# Patient Record
Sex: FEMALE | URBAN - METROPOLITAN AREA
[De-identification: names, ages, dates, MRNs, and addresses within clinical notes are randomized per-mention and may not be internally consistent; named-entity substitution may affect disease eponyms.]

---

## 2024-10-18 ENCOUNTER — OUTPATIENT (OUTPATIENT)
Dept: OUTPATIENT SERVICES | Facility: HOSPITAL | Age: 34
LOS: 1 days | End: 2024-10-18
Payer: COMMERCIAL

## 2024-10-18 VITALS — HEART RATE: 77 BPM | RESPIRATION RATE: 18 BRPM | OXYGEN SATURATION: 100 %

## 2024-10-18 VITALS — OXYGEN SATURATION: 99 % | HEART RATE: 91 BPM

## 2024-10-18 LAB
BLD GP AB SCN SERPL QL: NEGATIVE — SIGNIFICANT CHANGE UP
HCT VFR BLD CALC: 35.4 % — SIGNIFICANT CHANGE UP (ref 34.5–45)
HGB BLD-MCNC: 11.9 G/DL — SIGNIFICANT CHANGE UP (ref 11.5–15.5)
MCHC RBC-ENTMCNC: 31.2 PG — SIGNIFICANT CHANGE UP (ref 27–34)
MCHC RBC-ENTMCNC: 33.6 GM/DL — SIGNIFICANT CHANGE UP (ref 32–36)
MCV RBC AUTO: 92.7 FL — SIGNIFICANT CHANGE UP (ref 80–100)
NRBC # BLD: 0 /100 WBCS — SIGNIFICANT CHANGE UP (ref 0–0)
PLATELET # BLD AUTO: 271 K/UL — SIGNIFICANT CHANGE UP (ref 150–400)
RBC # BLD: 3.82 M/UL — SIGNIFICANT CHANGE UP (ref 3.8–5.2)
RBC # FLD: 12.7 % — SIGNIFICANT CHANGE UP (ref 10.3–14.5)
RH IG SCN BLD-IMP: POSITIVE — SIGNIFICANT CHANGE UP
WBC # BLD: 10.33 K/UL — SIGNIFICANT CHANGE UP (ref 3.8–10.5)
WBC # FLD AUTO: 10.33 K/UL — SIGNIFICANT CHANGE UP (ref 3.8–10.5)

## 2024-10-18 RX ORDER — ACETAMINOPHEN 325 MG
1000 TABLET ORAL ONCE
Refills: 0 | Status: COMPLETED | OUTPATIENT
Start: 2024-10-18 | End: 2024-10-18

## 2024-10-18 RX ORDER — FAMOTIDINE 40 MG
20 TABLET ORAL ONCE
Refills: 0 | Status: COMPLETED | OUTPATIENT
Start: 2024-10-18 | End: 2024-10-18

## 2024-10-18 RX ORDER — SODIUM CHLORIDE IRRIG SOLUTION 0.9 %
1000 SOLUTION, IRRIGATION IRRIGATION
Refills: 0 | Status: ACTIVE | OUTPATIENT
Start: 2024-10-18 | End: 2025-09-16

## 2024-10-18 RX ORDER — SODIUM CITRATE AND CITRIC ACID MONOHYDRATE 334; 500 MG/5ML; MG/5ML
30 SOLUTION ORAL ONCE
Refills: 0 | Status: COMPLETED | OUTPATIENT
Start: 2024-10-18 | End: 2024-10-18

## 2024-10-18 RX ADMIN — SODIUM CITRATE AND CITRIC ACID MONOHYDRATE 30 MILLILITER(S): 334; 500 SOLUTION ORAL at 09:10

## 2024-10-18 RX ADMIN — Medication 400 MILLIGRAM(S): at 11:52

## 2024-10-18 RX ADMIN — Medication 75 MILLILITER(S): at 12:06

## 2024-10-18 RX ADMIN — Medication 20 MILLIGRAM(S): at 08:58

## 2024-10-18 NOTE — OB PROVIDER TRIAGE NOTE - NSOBPROVIDERNOTE_OBGYN_ALL_OB_FT
A&P:   #Cerclage  - CBC/T&S  - FHR pre and post op  - s/p Pepcid and Bicitra  - Consents signed with the patient.   - Accepts blood      Discussed with Dr. Zachary Louise PGY2

## 2024-10-18 NOTE — BRIEF OPERATIVE NOTE - NSICDXBRIEFPROCEDURE_GEN_ALL_CORE_FT
PROCEDURES:  Luann cerclage of cervix during pregnancy by vaginal approach 18-Oct-2024 11:09:00  Chalo Fraire

## 2024-10-18 NOTE — BRIEF OPERATIVE NOTE - OPERATION/FINDINGS
EUA w/ closed cervix. Straight cath'ed for 40cc   5 Ticron used for a Kong Cerclage  (+)FH w/ gross FM seen after.   CL 4.29cm post-op w/ length distal 2.91cm

## 2024-10-18 NOTE — OB RN PREOPERATIVE CHECKLIST - NS PREOP CHK CHLOROHEX WASH
N/A
You can access the FollowMyHealth Patient Portal offered by [facility] by registering at the following website: [email or web address]. By joining [employer]’s FollowMyHealth portal, you will also be able to view your health information using other applications (apps) compatible with our system.

## 2024-10-18 NOTE — PRE-ANESTHESIA EVALUATION ADULT - NSANTHPMHFT_GEN_ALL_CORE
hx/o sz's 10/2019, s/p L crani for cavernous AVM;  pt denies seizures since however maintained on Keppra;  pt has regular f/u w/ neurologist, most recently this pregnancy;  Last imaging 10/23 showing s/p L craniotomy w/ postoperative cavity in the l frontal lobe, w/o hemorrhage, edema, or enhancement, otherwise unremarkable examination.  s/p urgent/emergent 2017 @ 30 wks (NRF), PRITESH ([facility])  Hx/o breast augmentation

## 2024-10-18 NOTE — OB RN PREOPERATIVE CHECKLIST - SURGICAL CONSENT
[facility]          Subjective:              Patient is a 50 y.o. female who is being seen as a returning patient for a diabetic pedal exam and multiple other lower extremity complaints. Pt states she has close follow up with her PCP (Dr. Oumou Mckeon). She states her last office visit  with her PCP was 05/24/2023. She states her diabetes has been out of control lately. She states she now has wounds to her right foot. She has completed her PT/OT. She states she has not been able to  her AFO. States she does have mild foot pain, rising to 2/10. Describes the pain  as a ache at the end of the day. She denies any recent trauma. Denies any local/systemic signs of infx. Denies any other LE complaints             Past Medical History:       Diagnosis                                       Past Surgical History:        Procedure                                                    Family History        Problem                                                   Social History            Tobacco Use                               No Known Allergies       Prior to Admission medications            Medication           insulin glargine (LANTUS,BASAGLAR) 100 unit/mL (3 mL) inpn    OneTouch Ultra Test strip           bacitracin zinc (BACITRACIN) ointment           Review of Systems    Constitutional: Negative. HENT: Negative. Eyes: Negative. Respiratory: Negative. Cardiovascular: Negative. Gastrointestinal:  Positive for diarrhea. Endocrine: Negative. Genitourinary: Negative. Musculoskeletal:  Positive for arthralgias. Skin: Negative. Allergic/Immunologic: Positive for immunocompromised state. Neurological:  Positive for numbness. Hematological: Negative. Psychiatric/Behavioral:  Positive for dysphoric mood. The patient  is nervous/anxious. All other systems reviewed and are negative.           Objective:        Visit Vitals done

## 2024-10-18 NOTE — OB PROVIDER TRIAGE NOTE - HISTORY OF PRESENT ILLNESS
Triage Note    Pt is a 32y/o  at 12w0d who presented to triage for history indicated cerclage placement.  Patient reports that she had a short cervix in her last pregnancy which was first noted around 15wk, she took progesterone suppositories during the pregnancy, and ended up delivering .    OBHx:   - 2017 pCS 3#14 at 30w3d for PTL, baby was C/S for breech  GynHx: denies h/o STI's, fibroids, cysts. +H/o abn pap s/p CKC  PMHx: seizure s/p brain bleed (related to AVM) in 2019, no no seizures since that time  PSHx: craniotomy 2019, C/Sx1, CKC 2016, breast augmentation  Med: Keppra 500BID, Macrobid  All: NKDA  SH: denies alcohol, tobacco, or drug use  Psych: +h/o anxiety  Accepts blood

## 2024-10-18 NOTE — OB PROVIDER TRIAGE NOTE - NSHPPHYSICALEXAM_GEN_ALL_CORE
Preop FHR: 164    General: comfortable, NAD  Pulm: unlabored breathing  Abd: gravid, soft, nontender

## 2024-10-18 NOTE — PRE-ANESTHESIA EVALUATION ADULT - NSANTHADDINFOFT_GEN_ALL_CORE
Pt. had seen neurologist recently, w/ letter of clearance for anesthesia. Pt. had seen neurologist recently, w/ letter of clearance for anesthesia.  Discussed w/ Dr. Brizuela this am, who agrees there is no contraindication to neuraxial anesthesia (spinal) given her history.  Explained risks/alternatives in detail to pt and pt's , including but not limited to HA, failure, nv injury.  All questions answered.

## 2025-04-09 ENCOUNTER — OUTPATIENT (OUTPATIENT)
Dept: OUTPATIENT SERVICES | Facility: HOSPITAL | Age: 35
LOS: 1 days | End: 2025-04-09
Payer: COMMERCIAL

## 2025-04-09 VITALS
HEIGHT: 63 IN | SYSTOLIC BLOOD PRESSURE: 104 MMHG | DIASTOLIC BLOOD PRESSURE: 67 MMHG | RESPIRATION RATE: 17 BRPM | OXYGEN SATURATION: 100 % | TEMPERATURE: 98 F | WEIGHT: 145.95 LBS | HEART RATE: 92 BPM

## 2025-04-09 DIAGNOSIS — O09.299 SUPERVISION OF PREGNANCY WITH OTHER POOR REPRODUCTIVE OR OBSTETRIC HISTORY, UNSPECIFIED TRIMESTER: Chronic | ICD-10-CM

## 2025-04-09 DIAGNOSIS — Z98.891 HISTORY OF UTERINE SCAR FROM PREVIOUS SURGERY: ICD-10-CM

## 2025-04-09 DIAGNOSIS — Q28.3 OTHER MALFORMATIONS OF CEREBRAL VESSELS: Chronic | ICD-10-CM

## 2025-04-09 DIAGNOSIS — O34.219 MATERNAL CARE FOR UNSPECIFIED TYPE SCAR FROM PREVIOUS CESAREAN DELIVERY: ICD-10-CM

## 2025-04-09 DIAGNOSIS — Z98.82 BREAST IMPLANT STATUS: Chronic | ICD-10-CM

## 2025-04-09 DIAGNOSIS — Z98.891 HISTORY OF UTERINE SCAR FROM PREVIOUS SURGERY: Chronic | ICD-10-CM

## 2025-04-09 DIAGNOSIS — Z98.890 OTHER SPECIFIED POSTPROCEDURAL STATES: Chronic | ICD-10-CM

## 2025-04-09 LAB
ANION GAP SERPL CALC-SCNC: 16 MMOL/L — SIGNIFICANT CHANGE UP (ref 5–17)
BLD GP AB SCN SERPL QL: NEGATIVE — SIGNIFICANT CHANGE UP
BUN SERPL-MCNC: 8 MG/DL — SIGNIFICANT CHANGE UP (ref 7–23)
CALCIUM SERPL-MCNC: 9.5 MG/DL — SIGNIFICANT CHANGE UP (ref 8.4–10.5)
CHLORIDE SERPL-SCNC: 101 MMOL/L — SIGNIFICANT CHANGE UP (ref 96–108)
CO2 SERPL-SCNC: 18 MMOL/L — LOW (ref 22–31)
CREAT SERPL-MCNC: 0.32 MG/DL — LOW (ref 0.5–1.3)
EGFR: 142 ML/MIN/1.73M2 — SIGNIFICANT CHANGE UP
EGFR: 142 ML/MIN/1.73M2 — SIGNIFICANT CHANGE UP
GLUCOSE SERPL-MCNC: 76 MG/DL — SIGNIFICANT CHANGE UP (ref 70–99)
HCT VFR BLD CALC: 32 % — LOW (ref 34.5–45)
HGB BLD-MCNC: 10.3 G/DL — LOW (ref 11.5–15.5)
MCHC RBC-ENTMCNC: 28 PG — SIGNIFICANT CHANGE UP (ref 27–34)
MCHC RBC-ENTMCNC: 32.2 G/DL — SIGNIFICANT CHANGE UP (ref 32–36)
MCV RBC AUTO: 87 FL — SIGNIFICANT CHANGE UP (ref 80–100)
NRBC BLD AUTO-RTO: 0 /100 WBCS — SIGNIFICANT CHANGE UP (ref 0–0)
PLATELET # BLD AUTO: 277 K/UL — SIGNIFICANT CHANGE UP (ref 150–400)
POTASSIUM SERPL-MCNC: 3.8 MMOL/L — SIGNIFICANT CHANGE UP (ref 3.5–5.3)
POTASSIUM SERPL-SCNC: 3.8 MMOL/L — SIGNIFICANT CHANGE UP (ref 3.5–5.3)
RBC # BLD: 3.68 M/UL — LOW (ref 3.8–5.2)
RBC # FLD: 14.3 % — SIGNIFICANT CHANGE UP (ref 10.3–14.5)
RH IG SCN BLD-IMP: POSITIVE — SIGNIFICANT CHANGE UP
SODIUM SERPL-SCNC: 135 MMOL/L — SIGNIFICANT CHANGE UP (ref 135–145)
WBC # BLD: 9.85 K/UL — SIGNIFICANT CHANGE UP (ref 3.8–10.5)
WBC # FLD AUTO: 9.85 K/UL — SIGNIFICANT CHANGE UP (ref 3.8–10.5)

## 2025-04-09 RX ORDER — LEVETIRACETAM 10 MG/ML
1 INJECTION, SOLUTION INTRAVENOUS
Refills: 0 | DISCHARGE

## 2025-04-09 RX ORDER — B1/B2/B3/B5/B6/B12/VIT C/FOLIC 500-0.5 MG
1 TABLET ORAL
Refills: 0 | DISCHARGE

## 2025-04-09 NOTE — OB PST NOTE - NSHPPHYSICALEXAM_GEN_ALL_CORE
Neurological: Alert & Oriented x 3  Respiratory: CTA B/L, No wheezing/crackles/rhonchi  Cardiovascular: (+) S1 & S2, RRR  Gastrointestinal: soft, NT, nondistended, (+) BS  Genitourinary: voiding freely  Extremities: No pedal edema

## 2025-04-09 NOTE — OB PST NOTE - NSANTHOSAYNRD_GEN_A_CORE
No. MARITA screening performed.  STOP BANG Legend: 0-2 = LOW Risk; 3-4 = INTERMEDIATE Risk; 5-8 = HIGH Risk

## 2025-04-09 NOTE — OB PST NOTE - NS_FINALEDD_OBGYN_ALL_OB_DT
hormonal replacement therapy (delestrogen & depotestosterone) , injection given via L Gluteal,  information given to pt about menopause & hrt medications, next appt on 09/27/17 @ 1:30   02-May-2025

## 2025-04-09 NOTE — OB PST NOTE - PROBLEM SELECTOR PLAN 1
Preop instructions and chlorhexidine soap given   hydration instructions provided  Labs CBC BMP T&S performed in PST   Continue with anti- seizure medications

## 2025-04-09 NOTE — OB PST NOTE - NSHPREVIEWOFSYSTEMS_GEN_ALL_CORE
Denies any lightheadedness, dizziness, CP, palpitations or SOB  Denies any n/v or abdominal pain  Denies any vaginal bleeding or spotting

## 2025-04-09 NOTE — OB PST NOTE - HISTORY OF PRESENT ILLNESS
This is a 34 year old female currently 37 weeks gestation,  cerclage placed at 12 weeks gestation. H/o Seizures (lastly 2019) s/p AVM repair in 2019. Follows up with neurology annually, Dr. Armand Brantley ([facility]). Presenting to PST with  for scheduled Repeat  w/ bilateral tubal ligation on 25 on Dr. Stone         This is a 34 year old female currently 37 weeks gestation,  cerclage placed at 12 weeks gestation (due prior pregnancy h/o incompetent cervix). H/o Seizures (lastly 2019) s/p AVM repair in 2019. Follows up with neurology annually, Dr. Armand Brantley ([facility]). Presenting to PST with  for scheduled Repeat  w/ bilateral tubal ligation on 25 on Dr. Stone

## 2025-04-09 NOTE — OB PST NOTE - ASSESSMENT
DASI Score: 8  DASI Activity: able to go up one flight of stairs or walk 1-2 blocks without difficulty  Loose or removable teeth: denies      CAPRINI SCORE    AGE RELATED RISK FACTORS                                                             [ ] Age 41-60 years                                            (1 Point)  [ ] Age: 61-74 years                                           (2 Points)                 [ ] Age= 75 years                                                (3 Points)             DISEASE RELATED RISK FACTORS                                                       [ ] Edema in the lower extremities                 (1 Point)                     [ ] Varicose veins                                               (1 Point)                                 [ ] BMI > 25 Kg/m2                                            (1 Point)                                  [ ] Serious infection (ie PNA)                            (1 Point)                     [ ] Lung disease ( COPD, Emphysema)            (1 Point)                                                                          [ ] Acute myocardial infarction                         (1 Point)                  [ ] Congestive heart failure (in the previous month)  (1 Point)         [ ] Inflammatory bowel disease                            (1 Point)                  [ ] Central venous access, PICC or Port               (2 points)       (within the last month)                                                                [ ] Stroke (in the previous month)                        (5 Points)    [ ] Previous or present malignancy                       (2 points)                                                                                                                                                         HEMATOLOGY RELATED FACTORS                                                         [ ] Prior episodes of VTE                                     (3 Points)                     [ ] Positive family history for VTE                      (3 Points)                  [ ] Prothrombin 17377 A                                     (3 Points)                     [ ] Factor V Leiden                                                (3 Points)                        [ ] Lupus anticoagulants                                      (3 Points)                                                           [ ] Anticardiolipin antibodies                              (3 Points)                                                       [ ] High homocysteine in the blood                   (3 Points)                                             [ ] Other congenital or acquired thrombophilia      (3 Points)                                                [ ] Heparin induced thrombocytopenia                  (3 Points)                                        MOBILITY RELATED FACTORS  [ ] Bed rest                                                         (1 Point)  [ ] Plaster cast                                                    (2 points)  [ ] Bed bound for more than 72 hours           (2 Points)    GENDER SPECIFIC FACTORS  [x ] Pregnancy or had a baby within the last month   (1 Point)  [ ] Post-partum < 6 weeks                                   (1 Point)  [ ] Hormonal therapy  or oral contraception   (1 Point)  [ ] History of pregnancy complications              (1 point)  [ ] Unexplained or recurrent              (1 Point)    OTHER RISK FACTORS                                           (1 Point)  [ ] BMI >40, smoking, diabetes requiring insulin, chemotherapy  blood transfusions and length of surgery over 2 hours    SURGERY RELATED RISK FACTORS  [ ]  Section within the last month     (1 Point)  [ ] Minor surgery                                                  (1 Point)  [ ] Arthroscopic surgery                                       (2 Points)  [ x] Planned major surgery lasting more            (2 Points)      than 45 minutes     [ ] Elective hip or knee joint replacement       (5 points)       surgery                                                TRAUMA RELATED RISK FACTORS  [ ] Fracture of the hip, pelvis, or leg                       (5 Points)  [ ] Spinal cord injury resulting in paralysis             (5 points)       (in the previous month)    [ ] Paralysis  (less than 1 month)                             (5 Points)  [ ] Multiple Trauma within 1 month                        (5 Points)    Total Score [3 ]    Caprini Score 0-2: Low Risk, NO VTE prophylaxis required for most patients, encourage ambulation  Caprini Score 3-6: Moderate Risk , pharmacologic VTE prophylaxis is indicated for most patients (in the absence of contraindications)  Caprini Score Greater than or =7: High risk, pharmocologic VTE prophylaxis indicated for most patients (in the absence of contraindications)                                     CAPRINI SCORE    AGE RELATED RISK FACTORS                                                             [ ] Age 41-60 years                                            (1 Point)  [ ] Age: 61-74 years                                           (2 Points)                 [ ] Age= 75 years                                                (3 Points)             DISEASE RELATED RISK FACTORS                                                       [ ] Edema in the lower extremities                 (1 Point)                     [ ] Varicose veins                                               (1 Point)                                 [ ] BMI > 25 Kg/m2                                            (1 Point)                                  [ ] Serious infection (ie PNA)                            (1 Point)                     [ ] Lung disease ( COPD, Emphysema)            (1 Point)                                                                          [ ] Acute myocardial infarction                         (1 Point)                  [ ] Congestive heart failure (in the previous month)  (1 Point)         [ ] Inflammatory bowel disease                            (1 Point)                  [ ] Central venous access, PICC or Port               (2 points)       (within the last month)                                                                [ ] Stroke (in the previous month)                        (5 Points)    [ ] Previous or present malignancy                       (2 points)                                                                                                                                                         HEMATOLOGY RELATED FACTORS                                                         [ ] Prior episodes of VTE                                     (3 Points)                     [ ] Positive family history for VTE                      (3 Points)                  [ ] Prothrombin 66585 A                                     (3 Points)                     [ ] Factor V Leiden                                                (3 Points)                        [ ] Lupus anticoagulants                                      (3 Points)                                                           [ ] Anticardiolipin antibodies                              (3 Points)                                                       [ ] High homocysteine in the blood                   (3 Points)                                             [ ] Other congenital or acquired thrombophilia      (3 Points)                                                [ ] Heparin induced thrombocytopenia                  (3 Points)                                        MOBILITY RELATED FACTORS  [ ] Bed rest                                                         (1 Point)  [ ] Plaster cast                                                    (2 points)  [ ] Bed bound for more than 72 hours           (2 Points)    GENDER SPECIFIC FACTORS  [x ] Pregnancy or had a baby within the last month   (1 Point)  [ ] Post-partum < 6 weeks                                   (1 Point)  [ ] Hormonal therapy  or oral contraception   (1 Point)  [ ] History of pregnancy complications              (1 point)  [ ] Unexplained or recurrent              (1 Point)    OTHER RISK FACTORS                                           (1 Point)  [ ] BMI >40, smoking, diabetes requiring insulin, chemotherapy  blood transfusions and length of surgery over 2 hours    SURGERY RELATED RISK FACTORS  [ ]  Section within the last month     (1 Point)  [ ] Minor surgery                                                  (1 Point)  [ ] Arthroscopic surgery                                       (2 Points)  [ x] Planned major surgery lasting more            (2 Points)      than 45 minutes     [ ] Elective hip or knee joint replacement       (5 points)       surgery                                                TRAUMA RELATED RISK FACTORS  [ ] Fracture of the hip, pelvis, or leg                       (5 Points)  [ ] Spinal cord injury resulting in paralysis             (5 points)       (in the previous month)    [ ] Paralysis  (less than 1 month)                             (5 Points)  [ ] Multiple Trauma within 1 month                        (5 Points)    Total Score [3 ]    Caprini Score 0-2: Low Risk, NO VTE prophylaxis required for most patients, encourage ambulation  Caprini Score 3-6: Moderate Risk , pharmacologic VTE prophylaxis is indicated for most patients (in the absence of contraindications)  Caprini Score Greater than or =7: High risk, pharmocologic VTE prophylaxis indicated for most patients (in the absence of contraindications)

## 2025-04-09 NOTE — OB PST NOTE - NSICDXPASTSURGICALHX_GEN_ALL_CORE_FT
PAST SURGICAL HISTORY:  Cerebral cavernous malformation     History of breast augmentation     History of cerclage, currently pregnant     History of  section     History of cone biopsy of cervix

## 2025-04-21 ENCOUNTER — INPATIENT (INPATIENT)
Facility: HOSPITAL | Age: 35
LOS: 1 days | Discharge: HOME | DRG: 951 | End: 2025-04-23
Attending: OBSTETRICS & GYNECOLOGY | Admitting: OBSTETRICS & GYNECOLOGY
Payer: COMMERCIAL

## 2025-04-21 ENCOUNTER — EMERGENCY (EMERGENCY)
Facility: HOSPITAL | Age: 35
LOS: 1 days | End: 2025-04-21
Attending: EMERGENCY MEDICINE | Admitting: EMERGENCY MEDICINE
Payer: COMMERCIAL

## 2025-04-21 VITALS
HEART RATE: 87 BPM | DIASTOLIC BLOOD PRESSURE: 79 MMHG | OXYGEN SATURATION: 99 % | HEIGHT: 63 IN | TEMPERATURE: 98 F | WEIGHT: 149.91 LBS | SYSTOLIC BLOOD PRESSURE: 125 MMHG | RESPIRATION RATE: 18 BRPM

## 2025-04-21 VITALS
TEMPERATURE: 98 F | RESPIRATION RATE: 5 BRPM | SYSTOLIC BLOOD PRESSURE: 123 MMHG | DIASTOLIC BLOOD PRESSURE: 73 MMHG | HEART RATE: 56 BPM | OXYGEN SATURATION: 100 %

## 2025-04-21 DIAGNOSIS — O09.299 SUPERVISION OF PREGNANCY WITH OTHER POOR REPRODUCTIVE OR OBSTETRIC HISTORY, UNSPECIFIED TRIMESTER: Chronic | ICD-10-CM

## 2025-04-21 DIAGNOSIS — Z98.891 HISTORY OF UTERINE SCAR FROM PREVIOUS SURGERY: Chronic | ICD-10-CM

## 2025-04-21 DIAGNOSIS — Q28.3 OTHER MALFORMATIONS OF CEREBRAL VESSELS: Chronic | ICD-10-CM

## 2025-04-21 DIAGNOSIS — O26.899 OTHER SPECIFIED PREGNANCY RELATED CONDITIONS, UNSPECIFIED TRIMESTER: ICD-10-CM

## 2025-04-21 DIAGNOSIS — Z34.80 ENCOUNTER FOR SUPERVISION OF OTHER NORMAL PREGNANCY, UNSPECIFIED TRIMESTER: ICD-10-CM

## 2025-04-21 DIAGNOSIS — Z98.890 OTHER SPECIFIED POSTPROCEDURAL STATES: Chronic | ICD-10-CM

## 2025-04-21 DIAGNOSIS — Z98.82 BREAST IMPLANT STATUS: Chronic | ICD-10-CM

## 2025-04-21 PROBLEM — Z87.898 PERSONAL HISTORY OF OTHER SPECIFIED CONDITIONS: Chronic | Status: ACTIVE | Noted: 2025-04-09

## 2025-04-21 LAB
ADD ON TEST-SPECIMEN IN LAB: SIGNIFICANT CHANGE UP
BASOPHILS # BLD AUTO: 0.03 K/UL — SIGNIFICANT CHANGE UP (ref 0–0.2)
BASOPHILS NFR BLD AUTO: 0.3 % — SIGNIFICANT CHANGE UP (ref 0–2)
BLD GP AB SCN SERPL QL: NEGATIVE — SIGNIFICANT CHANGE UP
EOSINOPHIL # BLD AUTO: 0.11 K/UL — SIGNIFICANT CHANGE UP (ref 0–0.5)
EOSINOPHIL NFR BLD AUTO: 1.2 % — SIGNIFICANT CHANGE UP (ref 0–6)
HCT VFR BLD CALC: 33.1 % — LOW (ref 34.5–45)
HGB BLD-MCNC: 10.5 G/DL — LOW (ref 11.5–15.5)
HIV 1 & 2 AB SERPL IA.RAPID: SIGNIFICANT CHANGE UP
IMM GRANULOCYTES NFR BLD AUTO: 1.1 % — HIGH (ref 0–0.9)
LYMPHOCYTES # BLD AUTO: 1.92 K/UL — SIGNIFICANT CHANGE UP (ref 1–3.3)
LYMPHOCYTES # BLD AUTO: 20.6 % — SIGNIFICANT CHANGE UP (ref 13–44)
MCHC RBC-ENTMCNC: 26.6 PG — LOW (ref 27–34)
MCHC RBC-ENTMCNC: 31.7 G/DL — LOW (ref 32–36)
MCV RBC AUTO: 84 FL — SIGNIFICANT CHANGE UP (ref 80–100)
MONOCYTES # BLD AUTO: 0.77 K/UL — SIGNIFICANT CHANGE UP (ref 0–0.9)
MONOCYTES NFR BLD AUTO: 8.3 % — SIGNIFICANT CHANGE UP (ref 2–14)
NEUTROPHILS # BLD AUTO: 6.37 K/UL — SIGNIFICANT CHANGE UP (ref 1.8–7.4)
NEUTROPHILS NFR BLD AUTO: 68.5 % — SIGNIFICANT CHANGE UP (ref 43–77)
NRBC BLD AUTO-RTO: 0 /100 WBCS — SIGNIFICANT CHANGE UP (ref 0–0)
PLATELET # BLD AUTO: 331 K/UL — SIGNIFICANT CHANGE UP (ref 150–400)
RBC # BLD: 3.94 M/UL — SIGNIFICANT CHANGE UP (ref 3.8–5.2)
RBC # FLD: 14.6 % — HIGH (ref 10.3–14.5)
RH IG SCN BLD-IMP: POSITIVE — SIGNIFICANT CHANGE UP
WBC # BLD: 9.3 K/UL — SIGNIFICANT CHANGE UP (ref 3.8–10.5)
WBC # FLD AUTO: 9.3 K/UL — SIGNIFICANT CHANGE UP (ref 3.8–10.5)

## 2025-04-21 DEVICE — INTERCEED 3 X 4": Type: IMPLANTABLE DEVICE | Status: FUNCTIONAL

## 2025-04-21 DEVICE — SURGICEL POWDER 3 GRAMS: Type: IMPLANTABLE DEVICE | Status: FUNCTIONAL

## 2025-04-21 RX ORDER — SODIUM CHLORIDE 9 G/1000ML
1000 INJECTION, SOLUTION INTRAVENOUS
Refills: 0 | Status: DISCONTINUED | OUTPATIENT
Start: 2025-04-21 | End: 2025-04-21

## 2025-04-21 RX ORDER — MAGNESIUM HYDROXIDE 400 MG/5ML
30 SUSPENSION ORAL
Refills: 0 | Status: DISCONTINUED | OUTPATIENT
Start: 2025-04-21 | End: 2025-04-23

## 2025-04-21 RX ORDER — OXYTOCIN-SODIUM CHLORIDE 0.9% IV SOLN 30 UNIT/500ML 30-0.9/5 UT/ML-%
42 SOLUTION INTRAVENOUS
Qty: 30 | Refills: 0 | Status: DISCONTINUED | OUTPATIENT
Start: 2025-04-21 | End: 2025-04-23

## 2025-04-21 RX ORDER — ACETAMINOPHEN 500 MG/5ML
975 LIQUID (ML) ORAL
Refills: 0 | Status: DISCONTINUED | OUTPATIENT
Start: 2025-04-21 | End: 2025-04-23

## 2025-04-21 RX ORDER — CLOSTRIDIUM TETANI TOXOID ANTIGEN (FORMALDEHYDE INACTIVATED), CORYNEBACTERIUM DIPHTHERIAE TOXOID ANTIGEN (FORMALDEHYDE INACTIVATED), BORDETELLA PERTUSSIS TOXOID ANTIGEN (GLUTARALDEHYDE INACTIVATED), BORDETELLA PERTUSSIS FILAMENTOUS HEMAGGLUTININ ANTIGEN (FORMALDEHYDE INACTIVATED), BORDETELLA PERTUSSIS PERTACTIN ANTIGEN, AND BORDETELLA PERTUSSIS FIMBRIAE 2/3 ANTIGEN 5; 2; 2.5; 5; 3; 5 [LF]/.5ML; [LF]/.5ML; UG/.5ML; UG/.5ML; UG/.5ML; UG/.5ML
0.5 INJECTION, SUSPENSION INTRAMUSCULAR ONCE
Refills: 0 | Status: DISCONTINUED | OUTPATIENT
Start: 2025-04-21 | End: 2025-04-23

## 2025-04-21 RX ORDER — DIPHENHYDRAMINE HCL 12.5MG/5ML
25 ELIXIR ORAL EVERY 6 HOURS
Refills: 0 | Status: DISCONTINUED | OUTPATIENT
Start: 2025-04-21 | End: 2025-04-23

## 2025-04-21 RX ORDER — CITRIC ACID/SODIUM CITRATE 300-500 MG
30 SOLUTION, ORAL ORAL ONCE
Refills: 0 | Status: COMPLETED | OUTPATIENT
Start: 2025-04-21 | End: 2025-04-21

## 2025-04-21 RX ORDER — NALOXONE HYDROCHLORIDE 0.4 MG/ML
0.1 INJECTION, SOLUTION INTRAMUSCULAR; INTRAVENOUS; SUBCUTANEOUS
Refills: 0 | Status: DISCONTINUED | OUTPATIENT
Start: 2025-04-21 | End: 2025-04-22

## 2025-04-21 RX ORDER — OXYCODONE HYDROCHLORIDE 30 MG/1
5 TABLET ORAL
Refills: 0 | Status: COMPLETED | OUTPATIENT
Start: 2025-04-21 | End: 2025-04-28

## 2025-04-21 RX ORDER — DEXAMETHASONE 0.5 MG/1
4 TABLET ORAL EVERY 6 HOURS
Refills: 0 | Status: DISCONTINUED | OUTPATIENT
Start: 2025-04-21 | End: 2025-04-22

## 2025-04-21 RX ORDER — OXYCODONE HYDROCHLORIDE 30 MG/1
5 TABLET ORAL ONCE
Refills: 0 | Status: DISCONTINUED | OUTPATIENT
Start: 2025-04-21 | End: 2025-04-23

## 2025-04-21 RX ORDER — SODIUM CHLORIDE 9 G/1000ML
1000 INJECTION, SOLUTION INTRAVENOUS
Refills: 0 | Status: DISCONTINUED | OUTPATIENT
Start: 2025-04-21 | End: 2025-04-23

## 2025-04-21 RX ORDER — OXYCODONE HYDROCHLORIDE 30 MG/1
5 TABLET ORAL
Refills: 0 | Status: DISCONTINUED | OUTPATIENT
Start: 2025-04-21 | End: 2025-04-21

## 2025-04-21 RX ORDER — SIMETHICONE 80 MG
80 TABLET,CHEWABLE ORAL EVERY 4 HOURS
Refills: 0 | Status: DISCONTINUED | OUTPATIENT
Start: 2025-04-21 | End: 2025-04-23

## 2025-04-21 RX ORDER — HEPARIN SODIUM 1000 [USP'U]/ML
5000 INJECTION INTRAVENOUS; SUBCUTANEOUS EVERY 12 HOURS
Refills: 0 | Status: DISCONTINUED | OUTPATIENT
Start: 2025-04-21 | End: 2025-04-23

## 2025-04-21 RX ORDER — DEXAMETHASONE 0.5 MG/1
4 TABLET ORAL EVERY 6 HOURS
Refills: 0 | Status: DISCONTINUED | OUTPATIENT
Start: 2025-04-21 | End: 2025-04-21

## 2025-04-21 RX ORDER — MODIFIED LANOLIN 100 %
1 CREAM (GRAM) TOPICAL EVERY 6 HOURS
Refills: 0 | Status: DISCONTINUED | OUTPATIENT
Start: 2025-04-21 | End: 2025-04-23

## 2025-04-21 RX ORDER — DIPHENHYDRAMINE HCL 12.5MG/5ML
25 ELIXIR ORAL EVERY 4 HOURS
Refills: 0 | Status: DISCONTINUED | OUTPATIENT
Start: 2025-04-21 | End: 2025-04-22

## 2025-04-21 RX ORDER — INFLUENZA A VIRUS A/IDAHO/07/2018 (H1N1) ANTIGEN (MDCK CELL DERIVED, PROPIOLACTONE INACTIVATED, INFLUENZA A VIRUS A/INDIANA/08/2018 (H3N2) ANTIGEN (MDCK CELL DERIVED, PROPIOLACTONE INACTIVATED), INFLUENZA B VIRUS B/SINGAPORE/INFTT-16-0610/2016 ANTIGEN (MDCK CELL DERIVED, PROPIOLACTONE INACTIVATED), INFLUENZA B VIRUS B/IOWA/06/2017 ANTIGEN (MDCK CELL DERIVED, PROPIOLACTONE INACTIVATED) 15; 15; 15; 15 UG/.5ML; UG/.5ML; UG/.5ML; UG/.5ML
0.5 INJECTION, SUSPENSION INTRAMUSCULAR ONCE
Refills: 0 | Status: DISCONTINUED | OUTPATIENT
Start: 2025-04-21 | End: 2025-04-23

## 2025-04-21 RX ORDER — KETOROLAC TROMETHAMINE 30 MG/ML
30 INJECTION, SOLUTION INTRAMUSCULAR; INTRAVENOUS EVERY 6 HOURS
Refills: 0 | Status: DISCONTINUED | OUTPATIENT
Start: 2025-04-21 | End: 2025-04-22

## 2025-04-21 RX ORDER — NALBUPHINE HYDROCHLORIDE 10 MG/ML
2.5 INJECTION INTRAMUSCULAR; INTRAVENOUS; SUBCUTANEOUS EVERY 6 HOURS
Refills: 0 | Status: DISCONTINUED | OUTPATIENT
Start: 2025-04-21 | End: 2025-04-22

## 2025-04-21 RX ORDER — NALBUPHINE HYDROCHLORIDE 10 MG/ML
2.5 INJECTION INTRAMUSCULAR; INTRAVENOUS; SUBCUTANEOUS EVERY 6 HOURS
Refills: 0 | Status: DISCONTINUED | OUTPATIENT
Start: 2025-04-21 | End: 2025-04-21

## 2025-04-21 RX ORDER — ONDANSETRON HCL/PF 4 MG/2 ML
4 VIAL (ML) INJECTION EVERY 6 HOURS
Refills: 0 | Status: DISCONTINUED | OUTPATIENT
Start: 2025-04-21 | End: 2025-04-22

## 2025-04-21 RX ORDER — IBUPROFEN 200 MG
600 TABLET ORAL EVERY 6 HOURS
Refills: 0 | Status: COMPLETED | OUTPATIENT
Start: 2025-04-21 | End: 2026-03-20

## 2025-04-21 RX ORDER — NALOXONE HYDROCHLORIDE 0.4 MG/ML
0.1 INJECTION, SOLUTION INTRAMUSCULAR; INTRAVENOUS; SUBCUTANEOUS
Refills: 0 | Status: DISCONTINUED | OUTPATIENT
Start: 2025-04-21 | End: 2025-04-21

## 2025-04-21 RX ORDER — ACETAMINOPHEN 500 MG/5ML
1000 LIQUID (ML) ORAL ONCE
Refills: 0 | Status: COMPLETED | OUTPATIENT
Start: 2025-04-21 | End: 2025-04-21

## 2025-04-21 RX ORDER — LEVETIRACETAM 10 MG/ML
750 INJECTION, SOLUTION INTRAVENOUS
Refills: 0 | Status: DISCONTINUED | OUTPATIENT
Start: 2025-04-21 | End: 2025-04-23

## 2025-04-21 RX ORDER — DIPHENHYDRAMINE HCL 12.5MG/5ML
25 ELIXIR ORAL EVERY 4 HOURS
Refills: 0 | Status: DISCONTINUED | OUTPATIENT
Start: 2025-04-21 | End: 2025-04-21

## 2025-04-21 RX ORDER — ONDANSETRON HCL/PF 4 MG/2 ML
4 VIAL (ML) INJECTION EVERY 6 HOURS
Refills: 0 | Status: DISCONTINUED | OUTPATIENT
Start: 2025-04-21 | End: 2025-04-21

## 2025-04-21 RX ADMIN — Medication 975 MILLIGRAM(S): at 14:41

## 2025-04-21 RX ADMIN — KETOROLAC TROMETHAMINE 30 MILLIGRAM(S): 30 INJECTION, SOLUTION INTRAMUSCULAR; INTRAVENOUS at 17:55

## 2025-04-21 RX ADMIN — Medication 20 MILLIGRAM(S): at 02:34

## 2025-04-21 RX ADMIN — HEPARIN SODIUM 5000 UNIT(S): 1000 INJECTION INTRAVENOUS; SUBCUTANEOUS at 23:18

## 2025-04-21 RX ADMIN — KETOROLAC TROMETHAMINE 30 MILLIGRAM(S): 30 INJECTION, SOLUTION INTRAMUSCULAR; INTRAVENOUS at 11:46

## 2025-04-21 RX ADMIN — Medication 400 MILLIGRAM(S): at 07:41

## 2025-04-21 RX ADMIN — KETOROLAC TROMETHAMINE 30 MILLIGRAM(S): 30 INJECTION, SOLUTION INTRAMUSCULAR; INTRAVENOUS at 18:26

## 2025-04-21 RX ADMIN — KETOROLAC TROMETHAMINE 30 MILLIGRAM(S): 30 INJECTION, SOLUTION INTRAMUSCULAR; INTRAVENOUS at 23:18

## 2025-04-21 RX ADMIN — HEPARIN SODIUM 5000 UNIT(S): 1000 INJECTION INTRAVENOUS; SUBCUTANEOUS at 11:47

## 2025-04-21 RX ADMIN — Medication 30 MILLILITER(S): at 02:34

## 2025-04-21 RX ADMIN — SODIUM CHLORIDE 200 MILLILITER(S): 9 INJECTION, SOLUTION INTRAVENOUS at 02:15

## 2025-04-21 RX ADMIN — LEVETIRACETAM 750 MILLIGRAM(S): 10 INJECTION, SOLUTION INTRAVENOUS at 17:55

## 2025-04-21 RX ADMIN — Medication 975 MILLIGRAM(S): at 15:11

## 2025-04-21 RX ADMIN — LEVETIRACETAM 750 MILLIGRAM(S): 10 INJECTION, SOLUTION INTRAVENOUS at 07:47

## 2025-04-21 RX ADMIN — Medication 975 MILLIGRAM(S): at 20:10

## 2025-04-21 RX ADMIN — Medication 975 MILLIGRAM(S): at 20:40

## 2025-04-21 RX ADMIN — KETOROLAC TROMETHAMINE 30 MILLIGRAM(S): 30 INJECTION, SOLUTION INTRAMUSCULAR; INTRAVENOUS at 12:20

## 2025-04-21 RX ADMIN — KETOROLAC TROMETHAMINE 30 MILLIGRAM(S): 30 INJECTION, SOLUTION INTRAMUSCULAR; INTRAVENOUS at 23:48

## 2025-04-21 NOTE — OB PROVIDER H&P - NSHPPHYSICALEXAM_GEN_ALL_CORE
Vital Signs Last 24 Hrs  T(C): 36.9 (21 Apr 2025 01:02), Max: 36.9 (21 Apr 2025 01:02)  T(F): 98.4 (21 Apr 2025 01:02), Max: 98.4 (21 Apr 2025 01:02)  HR: 96 (21 Apr 2025 02:13) (56 - 108)  BP: 123/73 (21 Apr 2025 01:08) (123/73 - 125/79)  BP(mean): --  RR: 5 (21 Apr 2025 01:02) (5 - 18)  SpO2: 100% (21 Apr 2025 02:13) (90% - 100%)    Parameters below as of 21 Apr 2025 01:02  Patient On (Oxygen Delivery Method): room air        Physical Exam:  Gen: NAD, AOx3  CV: RR  Resp: unlabored respirations  Abd: soft, NT, ND    Speculum Exam: positive pooling/nitrazine/ferning     FHT: baseline 140/mod variability/-accels/-decels  [place]: q7-8min  Sono: vertex

## 2025-04-21 NOTE — OB PROVIDER H&P - HISTORY OF PRESENT ILLNESS
HPI: Pt is a 33yo  @38w3d w/ history indicated cerclage in place presenting for ROR. Patient reports that at about 12am, she heard a pop and then felt a gush of fluid. She has been continuously leaking fluid since then. She also began to have painful contractions since being in triage. +FM -VB    Prenatal Issues:   History indicated cerclage placed at 12w   GBS negative   EFW 3600g    OBHx:   - 1 SAB, no D&C  - 2017 pCS 3#14 at 30w3d for PTL, baby was C/S for breech  GynHx: denies h/o STI's, fibroids, cysts. +H/o abn pap s/p CKC  PMHx: seizure s/p brain bleed (related to AVM) in 2019, no no seizures since  that time  PSHx: craniotomy 2019, C/Sx1, CKC 2016, breast augmentation  Med: Keppra 750BID, PNV  All: NKDA  SH: denies alcohol, tobacco, or drug use  Psych: +h/o anxiety  Accepts blood

## 2025-04-21 NOTE — OB PROVIDER H&P - ASSESSMENT
A/P:  Pt is a 33yo  @38w3d w/ history indicated Kong cerclage in place presenting for ROR. Patient found to be grossly ruptured on exam. Patient painfully roselyn. Patient admitted to L&D for repeat  section and bilateral salpingectomy.     1. Admit to LND. Routine Labs. IVF  2. for repeat  section and BS  3. Fetus: Cat 1 tracing, Vertex, EFW 3600 . C/w EFM.  4. history indicated Kong cerclage in place   5. GBS neg  6. anesthesia consult  7. NPO @9pm       D/w Dr. Endy Quijano PGY1

## 2025-04-21 NOTE — OB PROVIDER H&P - ATTENDING COMMENTS
Pt presenting in labor and SROM. Has cerclage in place and prior c/s for rLTCS + Bilateral Salpingectomy. r/b/a discussed. Will take to OR.    Reza Schumacher MD

## 2025-04-21 NOTE — OB RN TRIAGE NOTE - NS_TRIAGEPROVIDERNOTIFIED_OBGYN_ALL_OB_FT
Recommendations from today's MTM visit:                                                    Today we reviewed what your medicines are for, how to know if they are working, that your medicines are safe and how to make your medicine regimen as easy as possible.     1. I will forward the information you provided me about Walker's weight gain since discharge to Dr. Argueta.  I will also request that he order a new blood pressure cuff for Walker in an appropriate size.      2.  I will pass on information about your home infusion pump to the team at [facility] (formerly [facility]3].  If you do not receive a phone call regarding this issue, please bring it up at your next cardiology visit.      3. I have added tolterodine ER 4 mg capsules daily to Walker's medication list.  If he develops constipation and/or stomach pain, this could be a sign that tolterodine is slowing gastric emptying and allowing potassium to wear on his stomach.  Please call a member of Walker's health care team right away if this occurs.    4.  Good luck with your support group!  I hope you create a strong community of folks that can support each other as caregivers.      Next MTM visit: Please feel free to call me any time with medication-related questions!    To schedule another MTM appointment, please call the clinic directly or you may call the MTM scheduling line at [phone] or toll-free at [phone].     My Clinical Pharmacist's contact information:                                                      It was a pleasure seeing you today!  Please feel free to contact me with any questions or concerns you have.      Jnenifer Harris, Pharm.D.  Medication Therapy Management Pharmacist  Page/VM:  [phone]    You may receive a survey about the MTM services you received.  I would appreciate your feedback to help me serve you better in the future. Please fill it out and return it when you can. Your comments will be anonymous.          
Ken DAILY

## 2025-04-21 NOTE — ED PROVIDER NOTE - IV ALTEPLASE EXCL ABS HIDDEN
"BP (!) 178/97 (BP Location: Right leg)   Pulse 93   Temp 98.2  F (36.8  C) (Oral)   Resp 18   Ht 1.6 m (5' 3\")   Wt 113.9 kg (251 lb 3.2 oz)   LMP 04/30/2021 (Exact Date)   SpO2 95%   BMI 44.50 kg/m      [id number]. Hypertensive, OVSS on RA.  before lunch, sliding scale coverage given. PRN oxy, dilaudid, and tylenol given x 1. Denies nausea. Tolerating a low fiber diet. R midline SL. Voiding adequate amounts, not saving. Pt reports having soft BM's. Hydrocortisone enema given x 1. Pt will discharge this afternoon. Will continue to monitor and update with any changes.   " show

## 2025-04-21 NOTE — OB RN INTRAOPERATIVE NOTE - NSSELHIDDEN_OBGYN_ALL_OB_FT
[[email or web address]_ALL_OB_FT:[email or web address]],[NS_DeliveryAssist1_OBGYN_ALL_OB_FT:[id number]]

## 2025-04-21 NOTE — OB RN INTRAOPERATIVE NOTE - NS_IMPLANTS_OBGYN_ALL_OB
CHIEF COMPLAINT:  cystoscopy to evaluate pneumaturia      HISTORY OF PRESENT ILLNESS:  1.WINSTON   2. Frequent UTIs with signifincant dysuria, burning on urination, fever, gross hematuria   3.Sensation of pneumaturia, frequent UTI  4.WINSTON  5.T1DM  6. Shx hip surgery as an infant, gallbladder surgery, appendectomy, cordotomy    Last seen by myself on 24. This is a  57 y.o. female,  who presents for cystoscopy to rule out fistula. Patient has an intermittent sensation of pneumaturia, last noticed this morning. Did not notice this sensation while voiding just prior to her appointment today. Patient reports that she had an infection a couple weeks ago. She has had 4 infections since October and she is taking D-mannose. She is not utilizing vaginal estrogen cream or suppressive antibiotics. She notes that her blood sugar was high today, but her blood sugar has been generally well.       Review of Systems   Constitutional: Negative.    HENT: Negative.     Eyes: Negative.    Respiratory: Negative.     Cardiovascular: Negative.    Gastrointestinal: Negative.    Genitourinary:         REFER TO HPI   Musculoskeletal: Negative.    Skin: Negative.    Allergic/Immunologic: Negative.    Neurological: Negative.    Hematological: Negative.    Psychiatric/Behavioral: Negative.         PHYSICAL EXAMINATION:  No LMP recorded. Patient is postmenopausal.  Body mass index is 24.69 kg/m².  Visit Vitals  /75   Pulse 89   Temp 36.3 °C (97.3 °F)   Wt 61.2 kg (135 lb)   BMI 24.69 kg/m²   OB Status Postmenopausal   Smoking Status Never   BSA 1.64 m²     NP: Constitutional: alert and in no acute distress, well developed, well nourished.   Head and Face: head and face: unremarkable.   Neck: no neck asymmetry, supple.   Pulmonary: no respiratory distress, unremarkable respiratory effort.   Skin: normal skin color and pigmentation, normal skin turgor, and no rash.   Neurologic: cranial nerves: non-focal, grossly intact.    Psychiatric: alert and oriented x 3.       Urine dip:   Recent Results (from the past 6 hour(s))   POCT UA Automated manually resulted    Collection Time: 05/16/24  2:08 PM   Result Value Ref Range    POC Color, Urine Yellow Straw, Yellow, Light-Yellow    POC Appearance, Urine Clear Clear    POC Glucose, Urine 500 (3+) (A) NEGATIVE mg/dl    POC Bilirubin, Urine NEGATIVE NEGATIVE    POC Ketones, Urine NEGATIVE NEGATIVE mg/dl    POC Specific Gravity, Urine 1.020 1.005 - 1.035    POC Blood, Urine TRACE-Intact (A) NEGATIVE    POC PH, Urine 6.5 No Reference Range Established PH    POC Protein, Urine 30 (1+) NEGATIVE, 30 (1+) mg/dl    POC Urobilinogen, Urine 0.2 0.2, 1.0 EU/DL    Poc Nitrite, Urine NEGATIVE NEGATIVE    POC Leukocytes, Urine NEGATIVE NEGATIVE       I personally reviewed the recent A1c findings with the patient today in clinic. It demonstrated   Component  Ref Range & Units 1 mo ago 7 mo ago 1 yr ago   Hemoglobin A1C  see below % 5.8 High  6.3 High  8.3 High  R, CM   Estimated Average Glucose  Not Established mg/dL 120 134 192 R, CM       PROCEDURES:  Cystoscopy    Date/Time: 5/16/2024 2:31 PM    Performed by: Iván Abdul MD  Authorized by: Iván Abdul MD    Procedure - Bladder Cystoscopy:     Procedure details: cystoscopy    Post-procedure:     Patient tolerance: Patient tolerated the procedure well with no immediate complications      Comments:      [facility]   Indications for procedure: Sensation of pneumaturia, frequent UTI   Testing Results: UA results reviewed.   Discussed with patient: Risks, benefits, and alternative were discussed in detail. Patient appears to understand and agrees to proceed. Patient has signed the procedure consent form.   Cystoscopy findings: unremarkable introitus finding, unremarkable and urethra finding. We entered the bladder without infusion and there was a large pocket of air with inflamed mucosa underneath it, but this could be 2/2  UroJet effect, however there was no evidence of fistula. There was some patchy inflammation in the bladder wall with cystitis-like picture, erythema especially at the dome w/ cellules and diffuse in the bladder wall.  Post-Cystoscopy: Patient tolerated procedure without complications.        IMPRESSION AND PLAN:  1.WINSTON   2. Frequent UTIs with signifincant dysuria, burning on urination, fever, gross hematuria   3.Sensation of pneumauria   4.WINSTON  5.T1DM  6. Shx hip surgery as an infant, gallbladder surgery, appendectomy, cordotomy    Mattie Wolfe is a 57 y.o.  who presents with cystoscopy to rule out fistula.        UTI symptoms        Relevant Orders    POCT UA Automated manually resulted (Completed)    Cystoscopy          Cystoscopy showed some patchy inflammation in the bladder wall with cystitis-like picture, erythema especially at the dome w/ cellules and diffuse in the bladder wall. There was a large pocket of air with inflamed mucosa underneath it, possibly 2/2 UroJet effect. Patient feels comfortable taking a long course of antibiotics, prescribed today, and following up for repeat cystoscopy. We discussed that a biopsy may be appropriate if cystoscopy shows persistent inflammation.     All questions and concerns were answered and addressed.  The patient expressed understanding and agrees with the plan.       SIGNATURES  Scribe Attestation  By signing my name below, ISusie Scribe   attest that this documentation has been prepared under the direction and in the presence of Iván Abdul MD.     Interceed Absorb Adhesion/Other

## 2025-04-21 NOTE — OB RN PATIENT PROFILE - CHOOSE INDICATION TO IMMUNIZE (AN ORDER WILL BE GENERATED WHEN THIS NOTE IS SAVED):
Brief Progress Note    Asked by ED staff to check Yeni's cervix. Exam C/L/H with improvement of contractions. /moderate/+accel/no decel. Reactive tracing for gestational age.     Breathing improved after nebulizer treatment. ED with plans to give community acquired pneumonia abx due to RLL groundglass opacity. CTPE negative for PE. Care discussed with Dr. Cain and in house attending Dr. Segundo who is in agreement with the plan.    Teressa Caraballo MD PGY-2  [facility]  -------------------------------------  Patient noted to have two elevated blood pressures, thought to be due to wrong cuff size. CMP, CBC all within normal limits, PCR noted to be too low to calculate. Will update Dr. Cain with BP.     Teressa Caraballo MD PGY-2  [facility]   2:10 AM   --------------------------------------  Paged Dr. Cain regarding elevated BP, awaiting response.    Teressa Caraballo MD PGY-2  [facility]   6:43 AM    Attending  Addendum:  NST reactive: see separate note.    Patient was evaluated by ED physician in main ED for asthma exacerbation  Plan and follow up per Dr. Cain     Patient is pregnant regardless of trimester (administer thimerosal-free vaccine)

## 2025-04-21 NOTE — OB RN DELIVERY SUMMARY - NS_SEPSISRSKCALC_OBGYN_ALL_OB_FT
EOS calculated successfully. EOS Risk Factor: 0.5/1000 live births ([facility] national incidence); GA=38w3d; Temp=98.4; ROM=4.533; GBS='Negative'; Antibiotics='No antibiotics or any antibiotics < 2 hrs prior to birth'

## 2025-04-21 NOTE — OB PROVIDER DELIVERY SUMMARY - NSPROVIDERDELIVERYNOTE_OBGYN_ALL_OB_FT
repeat LTCS due to labor and rupture of membranes, uncomplicated  viable female infant, vertex presentation, 7#13, Apgars 9/9, cord gasses sent  Grossly normal fallopian tubes, uterus, and ovaries. Right paratubal cyst sent with fallopian tube. Omental adhesions to anterior abdominal wall and to bladder    Hysterotomy closed with Caprosyn in running locked fashion  Intercede and surgicel powder over hysterotomy  Fascia closed with 0.0 Vicryl in running fashion  Subcutaneous tissue closed with 2.0 Vicryl in running fashion  Skin closed with Quill    Cerclage removed, uncomplicated.  TXA given intraoperatively.    EBL: 787  IVF: 3000  UOP: 1200    Dictation#:    Dr. Schumacher present for entirety of delivery.  PAMELA Rogers, PGY-2 repeat LTCS, due to labor and rupture of membranes, bilateral salpingectomy, cerclage removal, uncomplicated  viable female infant, vertex presentation, 7#13, Apgars 9/9, cord gasses sent  Grossly normal fallopian tubes, uterus, and ovaries. Right paratubal cyst sent with fallopian tube. Omental adhesions to anterior abdominal wall and to bladder    Hysterotomy closed with Caprosyn in running locked fashion  Intercede and surgicel powder over hysterotomy  Fascia closed with 0.0 Vicryl in running fashion  Subcutaneous tissue closed with 2.0 Vicryl in running fashion  Skin closed with Quill    Cerclage removed, uncomplicated.  TXA given intraoperatively.    EBL: 787  IVF: 3000  UOP: 1200    Dictation#: [id number]    Dr. Schumacher present for entirety of delivery.  PAMELA Rogers, PGY-2

## 2025-04-21 NOTE — OB PROVIDER DELIVERY SUMMARY - NSLOWPPHRISK_OBGYN_A_OB
Castaneda Pregnancy Castaneda Pregnancy/Less than or equal to 4 previous vaginal births/No known bleeding disorder/No history of postpartum hemorrhage/No other PPH risks indicated

## 2025-04-21 NOTE — ED PROVIDER NOTE - CLINICAL SUMMARY MEDICAL DECISION MAKING FREE TEXT BOX
Dr. Stevens (Attending Physician)  34 year old  38 weeks pregnant ?water broke having contractions every 20 minutes. Cleared for transfer to L&D.

## 2025-04-21 NOTE — OB PROVIDER DELIVERY SUMMARY - NSSELHIDDEN_OBGYN_ALL_OB_FT
[NS_DeliveryAttending1_OBGYN_ALL_OB_FT:[id number]=],[NS_DeliveryAssist1_OBGYN_ALL_OB_FT:[id number]]

## 2025-04-21 NOTE — OB RN INTRAOPERATIVE NOTE - NS_OTHERPROCEDURER_OBGYN_ALL_OB_FT
"Virtual Visit Details    Type of service:  Video Visit   Video Start Time: 2:05 PM  Video End Time:2:50 PM    Originating Location (pt. Location): Home  Distant Location (provider location):  Off-site  Platform used for Video Visit: [facility]     Gastroenterology Visit for: Ml Evans 1949   MRN: [id number]  April 17, 2023      Ml Evans is a 74 year old female who is being evaluated via a billable video (or phone) visit.      The patient has been notified of following:     \"This video (or phone) visit will be conducted via a call between you and your physician/provider. We have found that certain health care needs can be provided without the need for an in-person physical exam.  This service lets us provide the care you need with a video conversation.  If a prescription is necessary we can send it directly to your pharmacy.  If lab work is needed we can place an order for that and you can then stop by our lab to have the test done at a later time.    If during the course of the call the physician/provider feels a video/phone visit is not appropriate, you will not be charged for this service.\"     Patient confirmed that they are in [place] for today's visit      Reason for Visit:  chief complaint dysphagia, abdominal pain    Referred by: Ricki  / [facility] [id number]  Patient Care Team:  Shwetha Robison DO as PCP - General (Internal Medicine)  Taniya Agustin, RN as Nurse Coordinator (Cardiology)  Talya Reina PA-C (Inactive) as Physician Assistant (Physician Assistant)  Shwetha Robison DO as Assigned PCP  Martell Biggs MD as MD (Interventional Cardiology)  Martell Biggs MD as Assigned Heart and Vascular Provider  Magdalena Selby PA-C as Assigned Surgical Provider  Jen Bardales MD as Assigned Musculoskeletal Provider    History of Present Illness:   Ml Evans is a 74 year old female.  Having GERD " issues for 4 to 5 years.  She reports a remote history of intermittent pain in left lower quadrant and eventually she was found to have diverticulitis with abscess in 2013.  This was managed conservatively with spontaneous improvement.  However she continued to have intermittent left lower quadrant pain and diarrhea.  Patient reports that now she is having following issues that are getting worse in the last 6 months:    1.  Dysphagia and regurgitation: Patient reports that she has been having difficulty with swallowing solids, primarily vegetables and raw meat which has been intermittent.  There are times when she cannot swallow this food types and then there are times when she is able to swallow multiple pills without any issues.  She reports that this issue has been happening more frequently over the last 6 months.  She reports regurgitation of ingested materials as well as chest tightness with this.  She denies any odynophagia.  She denies any significant weight loss either.    She reports having significant reflux over years.  She reports that she has regurgitation and heartburn both and underwent endoscopies in the past.  She reports family history of acid reflux disease.    2.  Nausea and vomiting: She also reports that she has been feeling nauseous at times and throwing up stomach contents that are bilious in nature.  She reports that she is worried that she is having large hiatal hernia causing problems.  She reports vomiting material that is ingested 24 hours ago and associated with significant amount of mucus exudates.  She reports that.  As of diarrhea and abdominal pain also considered with episodes of vomiting.  She reports having sharp, intermittent, pain that moves around and diffuse in the abdomen.  This episodes last for less than 5 minutes and eventually resolves.    3.  Bloating, burping and loose stools: Patient also reports having significant amount of burping and bloating throughout the day.   She reports loose intermittent stools.  She denies any constipation.  She reports that this have been going on in the last 6 months as well.    Patient denies symptom onset with starting any new medication.  She is currently taking lansoprazole 30 mg twice a day with some improvement in acid reflux and dysphagia symptoms.  Patient with history of severe fibromyalgia and has pain issues however she denies taking any opioids currently.       Wt Readings from Last 5 Encounters:   03/22/23 74.2 kg (163 lb 8 oz)   02/24/23 72.6 kg (160 lb)   11/07/22 72.6 kg (160 lb)   07/18/22 72.6 kg (160 lb)   06/23/22 72.6 kg (160 lb)        Esophageal Questionnaire(s)    BEDQ Questionnaire      4/17/2023    12:07 AM   BEDQ Questionnaire: How Often Have You Had the Following?   Trouble eating solid food (meat, bread, vegetables) 2   Trouble eating soft foods (yogurt, jello, pudding) 2   Trouble swallowing liquids 1   Pain while swallowing 0   Coughing or choking while swallowing foods or liquids 2   Total Score: 7         4/17/2023    12:07 AM   BEDQ Questionnaire: Discomfort/Pain Ratings   Eating solid food (meat, bread, vegetables) 2   Eating soft foods (yogurt, jello, pudding) 2   Drinking liquid 2   Total Score: 6       Eckardt Questionnaire      4/17/2023    12:11 AM   Eckardt Questionnaire   Dysphagia 1   Regurgitation 1   Retrosternal Pain 1   Weight Loss (kg) 0   Total Score:  3       Promis 10 Questionnaire      4/17/2023    12:16 AM   PROMIS 10 FLOWSHEET DATA   In general, would you say your health is: 3   In general, would you say your quality of life is: 3   In general, how would you rate your physical health? 2   In general, how would you rate your mental health, including your mood and your ability to think? 2   In general, how would you rate your satisfaction with your social activities and relationships? 2   In general, please rate how well you carry out your usual social activities and roles. (This includes  activities at home, at work and in your community, and responsibilities as a parent, child, spouse, employee, friend, etc.) 1   To what extent are you able to carry out your everyday physical activities such as walking, climbing stairs, carrying groceries, or moving a chair? 3   In the past 7 days, how often have you been bothered by emotional problems such as feeling anxious, depressed, or irritable? 4   In the past 7 days, how would you rate your fatigue on average? 3   In the past 7 days, how would you rate your pain on average, where 0 means no pain, and 10 means worst imaginable pain? 6   Mental health question re-calculation - no clinical value 2   Physical health question re-calculation - no clinical value 3   Pain question re-calculation - no clinical value 3   Global Mental Health Score 9   Global Physical Health Score 11   PROMIS TOTAL - SUBSCORES 20           STUDIES & PROCEDURES:    Lab Results   Component Value Date    EXAMENDO  11/07/2022     _______________________________________________________________________________  Patient Name: Ml Evans       Procedure Date: 11/7/2022 11:03 AM  MRN: [id number]                       YOB: 1949  Admit Type: Outpatient                Age: 73  Gender: Female                        Attending MD: CARLOS ARBOLEDA MD  Total Sedation Time:                    _______________________________________________________________________________     Procedure:             Upper GI endoscopy  Indications:           Suspected gastro-esophageal reflux disease  Providers:             CARLOS ARBOLEDA MD  Referring MD:          LILA SOMMER DO  Medicines:             Monitored Anesthesia Care  Complications:         No immediate complications.  _______________________________________________________________________________  Procedure:             After obtaining informed consent, the endoscope was                          passed under direct vision.  Throughout the procedure,                          the patient's blood pressure, pulse, and oxygen                          saturations were monitored continuously. The Endoscope                          was introduced through the mouth, and advanced to the                          second part of duodenum. The upper GI endoscopy was                          accomplished without difficulty. The patient tolerated                          the procedure well.                                                                                   Findings:       The gastroesophageal flap valve was visualized endoscopically and        classified as Hill Grade III (minimal fold, loose to endoscope, hiatal        hernia likely).       A small hiatal hernia was present.       Scattered moderate inflammation characterized by adherent blood,        congestion (edema) and granularity was found on the greater curvature of        the stomach and in the gastric antrum. Biopsies were taken with a cold        forceps for Helicobacter pylori testing. Verification of patient        identification for the specimen was done by the physician, nurse and        technician using the patient's name, birth date and medical record        number. Estimated blood loss was minimal.       A few 3 to 5 mm pedunculated and sessile polyps with no bleeding and no        stigmata of recent bleeding were found in the gastric body and in the        gastric antrum.       The ampulla, duodenal bulb, first portion of the duodenum and second        portion of the duodenum were normal.                                                                                   Impression:            - Gastroesophageal flap valve classified as Hill Grade                          III (minimal fold, loose to endoscope, hiatal hernia                          likely).                         - Small hiatal hernia.                         - Mucosal changes suspicious for chronic  gastritis.                          Biopsied.                         - A few gastric polyps.                         - Normal ampulla, duodenal bulb, first portion of the                          duodenum and second portion of the duodenum.  Recommendation:        - Patient has a contact number available for                          emergencies. The signs and symptoms of potential                          delayed complications were discussed with the patient.                          Return to normal activities tomorrow. Written                          discharge instructions were provided to the patient.                         - Resume previous diet.                         - Continue present medications.                         - Await pathology results.                         - Return to referring physician PRN.                                                                                     Electronically Signed by Dr. Cobian  ________________  [facility] CenterCHRISTOPHER COBIAN MD  11/7/2022 11:48:23 AM  I was physically present for the entire viewing portion of the exam.  [id number]-DLQCHRISTOPHER COBIAN MD  Number of Addenda: 0    Note Initiated On: 11/7/2022 11:03 AM  Scope In:  Scope Out:      EXAMENDO  08/18/2014     _______________________________________________________________________________  Patient Name: Ml Evans       Procedure Date: 8/18/2014 10:09:00 AM       MRN: [id number]                       YOB: 1949                    Admit Type: Outpatient                Age: 65                                     Gender: Female                        Attending MD: Anibal Loya MD           _______________________________________________________________________________     Procedure:                Upper GI endoscopy  Indications:              Heartburn  Providers:                Anibal Sebastian MD, Ml Jose, RN  Referring MD:             Ledy Benedict MD  Medicines:                Propofol  per Anesthesia  Complications:            No immediate complications  _______________________________________________________________________________  Procedure:                After obtaining informed consent, the endoscope was                             passed under direct vision. Throughout the                             procedure, the patient's blood pressure, pulse, and                             oxygen saturations were monitored continuously. The                             Gastroscope was introduced through the mouth, and                             advanced to the second part of duodenum. The upper                             GI endoscopy was accomplished without difficulty.                             The patient tolerated the procedure fairly well.                                                                                   Findings:       Localized erythematous mucosa was found in the duodenal bulb. The exam        of the duodenum was otherwise normal. Localized mild inflammation        characterized by congestion (edema) was found in the prepyloric region        of the stomach. Biopsies were taken with a cold forceps for Helicobacter        pylori testing. The cardia and gastric fundus were normal on        retroflexion. No gross lesions were noted in the entire esophagus. The        Z-line was regular and was found 37 cm from the incisors.                                                                                   Impression:               - Erythematous duodenopathy.                            - Gastritis. This was biopsied.                            - No gross lesions in esophagus.                            - Z-line regular, 37 cm from the incisors.                             [Biopsied].  Recommendation:           - Await pathology results.                                                                                     Anibal Sebastian MD  _____________________  Anibal Sebastian,  MD  Signed Date: 8/18/2014 10:37:38 AM  Number of Addenda: 0  Note Initiated On: 8/18/2014 10:09:00 AM  Scope Withdrawal Time: 0 hours 0 minutes 0 seconds   Scope Withdrawal Time: 0 hours 0 minutes 0 seconds   Total Procedure Duration: 0 hours 0 minutes 0 seconds   Total Procedure Duration: 0 hours 0 minutes 0 seconds       COLONOSCOPY  11/07/2022     _______________________________________________________________________________  Patient Name: Ml Evans       Procedure Date: 11/7/2022 11:02 AM  MRN: [id number]                       YOB: 1949  Admit Type: Outpatient                Age: 73  Gender: Female                        Attending MD: CARLOS ARBOLEDA MD  Total Sedation Time:                    _______________________________________________________________________________     Procedure:             Colonoscopy  Indications:           High risk colon cancer surveillance: Personal history                          of colonic polyps  Providers:             CARLOS ARBOLEDA MD  Referring MD:          LILA SOMMER, DO  Medicines:             Monitored Anesthesia Care  Complications:         No immediate complications.  _______________________________________________________________________________  Procedure:             After obtaining informed consent, the colonoscope was                          passed under direct vision. Throughout the procedure,                          the patient's blood pressure, pulse, and oxygen                          saturations were monitored continuously. The                          Colonoscope was introduced through the anus and                          advanced to the cecum, identified by appendiceal                          orifice and ileocecal valve. The Colonoscope was                          introduced through the anus and advanced to the cecum,                          identified by appendiceal orifice and ileocecal valve.                           b/l salpingectomy and cerclage removal The colonoscopy was performed without difficulty. The                          patient tolerated the procedure well. The quality of                          the bowel preparation was adequate. The ileocecal                          valve, appendiceal orifice, and rectum were                          photographed.                                                                                   Findings:       The perianal and digital rectal examinations were normal. Pertinent        negatives include normal sphincter tone.       A 4 mm polyp was found in the ascending colon. The polyp was sessile.        The polyp was removed with a cold snare. Resection and retrieval were        complete. Verification of patient identification for the specimen was        done by the physician, nurse and technician using the patient's name,        birth date and medical record number. Estimated blood loss was minimal.       A few small-mouthed diverticula were found in the recto-sigmoid colon        and sigmoid colon.                                                                                   Impression:            - One 4 mm polyp in the ascending colon, removed with                          a cold snare. Resected and retrieved.                         - Diverticulosis in the recto-sigmoid colon and in the                          sigmoid colon.  Recommendation:        - Patient has a contact number available for                          emergencies. The signs and symptoms of potential                          delayed complications were discussed with the patient.                          Return to normal activities tomorrow. Written                          discharge instructions were provided to the patient.                         - Resume previous diet.                         - Continue present medications.                         - Await pathology results.                         - Repeat colonoscopy in 5 years for  surveillance.                         - Return to primary care physician PRN.                                                                                     Electronically Signed by Dr. Cobian  ________________  TAMERAANTWON COBIAN MD  11/7/2022 11:52:06 AM  I was physically present for the entire viewing portion of the exam.  [id number]-CATCHRISTOPHER COBIAN MD  Number of Addenda: 0    Note Initiated On: 11/7/2022 11:02 AM  Scope In: 11:27:17 AM  Scope Out: 11:39:36 AM      COLONOSCOPY  05/14/2015     _______________________________________________________________________________  Patient Name: Ml Evans       Procedure Date: 5/14/2015 8:02 AM  MRN: [id number]                       YOB: 1949  Admit Type: Outpatient                Age: 66  Gender: Female                        Attending MD: Ponce Christine MD  _______________________________________________________________________________     Procedure:           Colonoscopy  Indications:         Abdominal pain in the left lower quadrant, Abdominal                        pain in the right lower quadrant, Rectal bleeding  Providers:           Ponce Christine MD, Cheryl Barajas, JETHRO  Referring MD:        Taniya Duarte  Medicines:           Monitored Anesthesia Care  Complications:       No immediate complications.  _______________________________________________________________________________  Procedure:           After obtaining informed consent, the colonoscope was                        passed under direct vision. Throughout the procedure,                        the patient's blood pressure, pulse, and oxygen                        saturations were monitored continuously. The Colonoscope                        was introduced through the anus and advanced to the                        cecum, identified by appendiceal orifice and ileocecal                        valve. The colonoscopy was performed without difficulty.                        The  patient tolerated the procedure well. The quality of                        the bowel preparation was good.                                                                                   Findings:       The perianal and digital rectal examinations were normal. Pertinent        negatives include normal sphincter tone and no palpable rectal lesions.       A pedunculated polyp was found in the cecum. The polyp was 5 mm in size.        The polyp was removed with a cold biopsy forceps. Resection and        retrieval were complete.       Many medium-mouthed diverticula were found in the sigmoid colon.       There was mild spasm in the sigmoid colon.       The retroflexed view of the distal rectum and anal verge was normal and        showed no anal or rectal abnormalities.                                                                                   Impression:          - One 5 mm polyp in the cecum. Resected and retrieved.                       - Diverticulosis in the sigmoid colon.                       - Mild colonic spasm.  Recommendation:      - Use fiber, for example Citrucel, Fibercon, Konsyl or                        Metamucil.                       - Repeat colonoscopy in 5-10 years for surveillance                        based on pathology results.                                                                                     Signed electronically by Ponce Christine M.D.  ___________________  Ponce Christine MD  5/14/2015 8:48 AM  Number of Addenda: 0    Note Initiated On: 5/14/2015 8:02 AM              Prior medical records were reviewed including, but not limited to, notes from referring providers, lab work, radiographic tests, and other diagnostic tests. Pertinent results were summarized above.     History     Past Medical History:   Diagnosis Date     Attention deficit disorder without mention of hyperactivity      Benign essential hypertension 3/9/2017     Coronary artery disease march 15,2011     Two stents placed, angioplasty     Diabetes mellitus (H)     A1C 5.7-6.4, controlled with diet     Dysthymic disorder      GERD (gastroesophageal reflux disease) 1/20/2011     Glycogenosis (H)      History of blood transfusion 1981    euptured ectopic pg     History of ST elevation myocardial infarction (STEMI) 1/23/2019     Malignant neoplasm (H)     squamous cell carcinoma many years ago     Other and unspecified hyperlipidemia      Squamous cell carcinoma        Past Surgical History:   Procedure Laterality Date     ABDOMEN SURGERY  1981,1991,1993     APPENDECTOMY  1993     BIOPSY  2003    nose, during surgery     COLONOSCOPY  2005     COLONOSCOPY N/A 5/14/2015    Procedure: COMBINED COLONOSCOPY, SINGLE OR MULTIPLE BIOPSY/POLYPECTOMY BY BIOPSY;  Surgeon: Ponce Christine MD;  Location: [facility]     COLONOSCOPY N/A 11/7/2022    Procedure: COLONOSCOPY, FLEXIBLE, WITH LESION REMOVAL USING SNARE;  Surgeon: Maximilian Cobian MD;  Location: [facility]     ECTOPIC PREGNANCY SURGERY  1981     ENDOSCOPIC RELEASE CARPAL TUNNEL  4/16/2013    Procedure: ENDOSCOPIC RELEASE CARPAL TUNNEL;  Right endoscopic carpal tunnel release;  Surgeon: Jonah Dela Cruz MD;  Location: [facility]     ENT SURGERY  2003    nose- suspected basal cell- was benign     ESOPHAGOSCOPY, GASTROSCOPY, DUODENOSCOPY (EGD), COMBINED  8/18/2014    Procedure: COMBINED ESOPHAGOSCOPY, GASTROSCOPY, DUODENOSCOPY (EGD), BIOPSY SINGLE OR MULTIPLE;  Surgeon: Anibal Sebastian MD;  Location: [facility]     ESOPHAGOSCOPY, GASTROSCOPY, DUODENOSCOPY (EGD), COMBINED N/A 11/7/2022    Procedure: ESOPHAGOGASTRODUODENOSCOPY, WITH BIOPSY;  Surgeon: Maximilian Cobian MD;  Location: [facility]     GENITOURINARY SURGERY  1981, 1991     HYSTERECTOMY, ELIECER      total hysterectomy. Unsure if ELIECER or vaginal     SURGICAL HISTORY OF -       appy     SURGICAL HISTORY OF -       T&A     VASCULAR SURGERY  2011    angioplasty- 2 stents implanted       Social History     Socioeconomic History     Marital  status:      Spouse name: Not on file     Number of children: Not on file     Years of education: Not on file     Highest education level: Not on file   Occupational History     Employer: RETIRED   Tobacco Use     Smoking status: Former     Packs/day: 1.00     Years: 30.00     Pack years: 30.00     Types: Cigarettes     Start date: 1968     Quit date: 3/15/2011     Years since quittin.0     Smokeless tobacco: Never     Tobacco comments:     occasional/daily   Vaping Use     Vaping status: Never Used   Substance and Sexual Activity     Alcohol use: No     Alcohol/week: 0.0 standard drinks of alcohol     Drug use: No     Sexual activity: Yes     Partners: Male     Birth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

## 2025-04-21 NOTE — OB RN DELIVERY SUMMARY - NSSELHIDDEN_OBGYN_ALL_OB_FT
[[email or web address]_OB_FT:[email or web address]=],[[email or web address]_ALL_OB_FT:[id number]]

## 2025-04-21 NOTE — OB RN INTRAOPERATIVE NOTE - NSDELIVPROC_OBGYN_ALL_OB
GEN: no fever, no chills, no weakness  HENT: no eye pain, no visual changes, no ear pain, no visual or hearing changes, no sore throat, no swelling or neck pain  CV: no chest pain, no palpitations, no dizziness, no swelling  RESP: no coughing, no sob, no IWOB, no CASTRO  GI: no abd pain, no distension, no nausea, no vomiting, no diarrhea, no constipation  : no dysuria,  no frequency, no hematuria, no discharge, no flank pain  MUSCULOSKELETAL: no myalgia, no arthralgia, no joint swelling, no bruising   SKIN: no rash, no wounds, no itching  NEURO: no change in mentation, no visual changes, no HA, no focal weakness, no trouble speaking, no gait abnormalities, no dizziness  PSYCH: no suidical ideation, no homicidal ideation, no depression, no anxiety, no hallucinations
 Section with Postpartum Sterilization/ Section with Other Procedure

## 2025-04-21 NOTE — PROGRESS NOTE ADULT - SUBJECTIVE AND OBJECTIVE BOX
Day 1 of Anesthesia Pain Management Service    SUBJECTIVE: Doing ok  Pain Scale Score:          [X] Refer to charted pain scores    THERAPY:  s/p   2  mg PF epidural morphine on 4\21\25       MEDICATIONS  (STANDING):  influenza   Vaccine 0.5 milliLiter(s) IntraMuscular once  ketorolac   Injectable 30 milliGRAM(s) IV Push every 6 hours  lactated ringers. 1000 milliLiter(s) (125 mL/Hr) IV Continuous <Continuous>  lactated ringers. 1000 milliLiter(s) (200 mL/Hr) IV Continuous <Continuous>  levETIRAcetam 750 milliGRAM(s) Oral two times a day  morphine PF Epidural 2 milliGRAM(s) Epidural once    MEDICATIONS  (PRN):  dexAMETHasone  Injectable 4 milliGRAM(s) IV Push every 6 hours PRN Nausea  diphenhydrAMINE 25 milliGRAM(s) Oral every 4 hours PRN Pruritus  nalbuphine Injectable 2.5 milliGRAM(s) IV Push every 6 hours PRN Pruritus  naloxone Injectable 0.1 milliGRAM(s) IV Push every 3 minutes PRN For ANY of the following changes in patient status:  A. Breaths Per Minute LESS THAN 10, B. Oxygen saturation LESS THAN 90%, C. Sedation score of 6 for Stop After: 4 Times  ondansetron Injectable 4 milliGRAM(s) IV Push every 6 hours PRN Nausea  oxyCODONE    IR 5 milliGRAM(s) Oral every 3 hours PRN Mild Pain (1 - 3)  oxyCODONE    IR 5 milliGRAM(s) Oral once PRN Moderate to Severe Pain (4-10)      OBJECTIVE:    Sedation:        	[X] Alert	 [ ] Drowsy	[ ] Arousable      [ ] Asleep       [ ] Unresponsive    Side Effects:	[X] None 	[ ] Nausea	[ ] Vomiting         [ ] Pruritus  		[ ] Weakness            [ ] Numbness	          [ ] Other:    Vital Signs Last 24 Hrs  T(C): 36.8 (21 Apr 2025 08:15), Max: 37.2 (21 Apr 2025 05:15)  T(F): 98.2 (21 Apr 2025 08:15), Max: 99 (21 Apr 2025 05:15)  HR: 84 (21 Apr 2025 08:45) (56 - 108)  BP: 116/67 (21 Apr 2025 08:45) (106/62 - 125/79)  BP(mean): 86 (21 Apr 2025 08:45) (74 - 86)  RR: 16 (21 Apr 2025 08:45) (5 - 18)  SpO2: 99% (21 Apr 2025 08:45) (90% - 100%)    Parameters below as of 21 Apr 2025 07:00  Patient On (Oxygen Delivery Method): room air        ASSESSMENT/ PLAN  [X] Patient to be transitioned to prn analgesics after24 hours  [X] Pain management per primary service, pain service to sign off   [X]Documentation and Verification of current medications

## 2025-04-22 LAB
BASOPHILS # BLD AUTO: 0.02 K/UL — SIGNIFICANT CHANGE UP (ref 0–0.2)
BASOPHILS NFR BLD AUTO: 0.2 % — SIGNIFICANT CHANGE UP (ref 0–2)
EOSINOPHIL # BLD AUTO: 0.07 K/UL — SIGNIFICANT CHANGE UP (ref 0–0.5)
EOSINOPHIL NFR BLD AUTO: 0.6 % — SIGNIFICANT CHANGE UP (ref 0–6)
HCT VFR BLD CALC: 26.8 % — LOW (ref 34.5–45)
HGB BLD-MCNC: 8.6 G/DL — LOW (ref 11.5–15.5)
IMM GRANULOCYTES NFR BLD AUTO: 0.9 % — SIGNIFICANT CHANGE UP (ref 0–0.9)
LYMPHOCYTES # BLD AUTO: 19.6 % — SIGNIFICANT CHANGE UP (ref 13–44)
LYMPHOCYTES # BLD AUTO: 2.46 K/UL — SIGNIFICANT CHANGE UP (ref 1–3.3)
MCHC RBC-ENTMCNC: 27.5 PG — SIGNIFICANT CHANGE UP (ref 27–34)
MCHC RBC-ENTMCNC: 32.1 G/DL — SIGNIFICANT CHANGE UP (ref 32–36)
MCV RBC AUTO: 85.6 FL — SIGNIFICANT CHANGE UP (ref 80–100)
MONOCYTES # BLD AUTO: 0.74 K/UL — SIGNIFICANT CHANGE UP (ref 0–0.9)
MONOCYTES NFR BLD AUTO: 5.9 % — SIGNIFICANT CHANGE UP (ref 2–14)
NEUTROPHILS # BLD AUTO: 9.18 K/UL — HIGH (ref 1.8–7.4)
NEUTROPHILS NFR BLD AUTO: 72.8 % — SIGNIFICANT CHANGE UP (ref 43–77)
NRBC BLD AUTO-RTO: 0 /100 WBCS — SIGNIFICANT CHANGE UP (ref 0–0)
PLATELET # BLD AUTO: 256 K/UL — SIGNIFICANT CHANGE UP (ref 150–400)
RBC # BLD: 3.13 M/UL — LOW (ref 3.8–5.2)
RBC # FLD: 14.7 % — HIGH (ref 10.3–14.5)
T PALLIDUM AB TITR SER: NEGATIVE — SIGNIFICANT CHANGE UP
WBC # BLD: 12.58 K/UL — HIGH (ref 3.8–10.5)
WBC # FLD AUTO: 12.58 K/UL — HIGH (ref 3.8–10.5)

## 2025-04-22 RX ORDER — OXYCODONE HYDROCHLORIDE 30 MG/1
5 TABLET ORAL
Refills: 0 | Status: DISCONTINUED | OUTPATIENT
Start: 2025-04-22 | End: 2025-04-23

## 2025-04-22 RX ORDER — IBUPROFEN 200 MG
600 TABLET ORAL EVERY 6 HOURS
Refills: 0 | Status: DISCONTINUED | OUTPATIENT
Start: 2025-04-22 | End: 2025-04-23

## 2025-04-22 RX ADMIN — Medication 600 MILLIGRAM(S): at 23:18

## 2025-04-22 RX ADMIN — Medication 975 MILLIGRAM(S): at 08:39

## 2025-04-22 RX ADMIN — Medication 975 MILLIGRAM(S): at 20:22

## 2025-04-22 RX ADMIN — Medication 975 MILLIGRAM(S): at 02:12

## 2025-04-22 RX ADMIN — Medication 975 MILLIGRAM(S): at 15:25

## 2025-04-22 RX ADMIN — Medication 600 MILLIGRAM(S): at 18:19

## 2025-04-22 RX ADMIN — HEPARIN SODIUM 5000 UNIT(S): 1000 INJECTION INTRAVENOUS; SUBCUTANEOUS at 11:59

## 2025-04-22 RX ADMIN — LEVETIRACETAM 750 MILLIGRAM(S): 10 INJECTION, SOLUTION INTRAVENOUS at 05:31

## 2025-04-22 RX ADMIN — OXYCODONE HYDROCHLORIDE 5 MILLIGRAM(S): 30 TABLET ORAL at 10:27

## 2025-04-22 RX ADMIN — Medication 975 MILLIGRAM(S): at 14:49

## 2025-04-22 RX ADMIN — KETOROLAC TROMETHAMINE 30 MILLIGRAM(S): 30 INJECTION, SOLUTION INTRAMUSCULAR; INTRAVENOUS at 05:31

## 2025-04-22 RX ADMIN — Medication 600 MILLIGRAM(S): at 12:30

## 2025-04-22 RX ADMIN — Medication 80 MILLIGRAM(S): at 12:00

## 2025-04-22 RX ADMIN — Medication 975 MILLIGRAM(S): at 09:15

## 2025-04-22 RX ADMIN — Medication 600 MILLIGRAM(S): at 11:59

## 2025-04-22 RX ADMIN — LEVETIRACETAM 750 MILLIGRAM(S): 10 INJECTION, SOLUTION INTRAVENOUS at 18:19

## 2025-04-22 RX ADMIN — OXYCODONE HYDROCHLORIDE 5 MILLIGRAM(S): 30 TABLET ORAL at 11:00

## 2025-04-22 RX ADMIN — Medication 600 MILLIGRAM(S): at 18:55

## 2025-04-22 RX ADMIN — Medication 975 MILLIGRAM(S): at 20:52

## 2025-04-22 RX ADMIN — Medication 975 MILLIGRAM(S): at 02:42

## 2025-04-22 RX ADMIN — KETOROLAC TROMETHAMINE 30 MILLIGRAM(S): 30 INJECTION, SOLUTION INTRAMUSCULAR; INTRAVENOUS at 06:01

## 2025-04-22 RX ADMIN — HEPARIN SODIUM 5000 UNIT(S): 1000 INJECTION INTRAVENOUS; SUBCUTANEOUS at 23:18

## 2025-04-22 NOTE — PROGRESS NOTE ADULT - SUBJECTIVE AND OBJECTIVE BOX
Postpartum Note,  Section   ATTENDING NOTE - Post-operative day 1    Subjective:  The patient feels well. Ambulating without difficulty  Pt is tolerating regular diet. Pain well controlled.  She denies nausea and vomiting.     wahl dc'd pt already voided    She reports normal postpartum bleeding    Physical exam:    Vital Signs Last 24 Hrs  T(C): 36.9 (2025 05:42), Max: 36.9 (2025 21:00)  T(F): 98.5 (2025 05:42), Max: 98.5 (2025 05:42)  HR: 84 (2025 05:42) (69 - 85)  BP: 102/66 (2025 05:42) (96/59 - 116/67)  BP(mean): 86 (2025 08:45) (86 - 86)  RR: 18 (2025 05:42) (16 - 18)  SpO2: 98% (2025 05:42) (97% - 100%)    Parameters below as of 2025 05:42  Patient On (Oxygen Delivery Method): room air        Gen: NAD  Breast: Soft, nontender, not engorged.  Abdomen: Soft, nontender, no distension , firm uterine fundus at umbilicus.  Incision: Clean, dry, and intact  Pelvic: Normal lochia noted  Ext: No calf tenderness, no hyper reflexia       LABS:                            8.6    12.58 )-----------( 256      ( 2025 06:58 )             26.8                         10.5   9.30  )-----------( 331      ( 2025 02:25 )             33.1                   Allergies    No Known Allergies    Intolerances      MEDICATIONS  (STANDING):  acetaminophen     Tablet .. 975 milliGRAM(s) Oral <User Schedule>  diphtheria/tetanus/pertussis (acellular) Vaccine (Adacel) 0.5 milliLiter(s) IntraMuscular once  heparin   Injectable 5000 Unit(s) SubCutaneous every 12 hours  ibuprofen  Tablet. 600 milliGRAM(s) Oral every 6 hours  influenza   Vaccine 0.5 milliLiter(s) IntraMuscular once  lactated ringers. 1000 milliLiter(s) (125 mL/Hr) IV Continuous <Continuous>  lactated ringers. 1000 milliLiter(s) (200 mL/Hr) IV Continuous <Continuous>  levETIRAcetam 750 milliGRAM(s) Oral two times a day  morphine PF Epidural 2 milliGRAM(s) Epidural once  oxytocin Infusion 42 milliUNIT(s)/Min (42 mL/Hr) IV Continuous <Continuous>    MEDICATIONS  (PRN):  dexAMETHasone  Injectable 4 milliGRAM(s) IV Push every 6 hours PRN Nausea  diphenhydrAMINE 25 milliGRAM(s) Oral every 4 hours PRN Pruritus  diphenhydrAMINE 25 milliGRAM(s) Oral every 6 hours PRN Pruritus  lanolin Ointment 1 Application(s) Topical every 6 hours PRN Sore Nipples  magnesium hydroxide Suspension 30 milliLiter(s) Oral two times a day PRN Constipation  nalbuphine Injectable 2.5 milliGRAM(s) IV Push every 6 hours PRN Pruritus  naloxone Injectable 0.1 milliGRAM(s) IV Push every 3 minutes PRN For ANY of the following changes in patient status:  A. Breaths Per Minute LESS THAN 10, B. Oxygen saturation LESS THAN 90%, C. Sedation score of 6 for Stop After: 4 Times  ondansetron Injectable 4 milliGRAM(s) IV Push every 6 hours PRN Nausea  oxyCODONE    IR 5 milliGRAM(s) Oral every 3 hours PRN Moderate to Severe Pain (4-10)  oxyCODONE    IR 5 milliGRAM(s) Oral once PRN Moderate to Severe Pain (4-10)  simethicone 80 milliGRAM(s) Chew every 4 hours PRN Gas        Assessment and Plan  POD # 1  s/p  section. Stable.  Encourage ambulation  Continue with PCEA/PCA  Regular diet as tolerated  Follow up CBC

## 2025-04-22 NOTE — PROGRESS NOTE ADULT - SUBJECTIVE AND OBJECTIVE BOX
Day 1 of Anesthesia Pain Management Service    SUBJECTIVE: Doing ok  Pain Scale Score:          [X] Refer to charted pain scores    THERAPY:  s/p   2  mg PF epidural morphine on 4\21\25       MEDICATIONS  (STANDING):  acetaminophen     Tablet .. 975 milliGRAM(s) Oral <User Schedule>  diphtheria/tetanus/pertussis (acellular) Vaccine (Adacel) 0.5 milliLiter(s) IntraMuscular once  heparin   Injectable 5000 Unit(s) SubCutaneous every 12 hours  ibuprofen  Tablet. 600 milliGRAM(s) Oral every 6 hours  influenza   Vaccine 0.5 milliLiter(s) IntraMuscular once  lactated ringers. 1000 milliLiter(s) (125 mL/Hr) IV Continuous <Continuous>  lactated ringers. 1000 milliLiter(s) (200 mL/Hr) IV Continuous <Continuous>  levETIRAcetam 750 milliGRAM(s) Oral two times a day  morphine PF Epidural 2 milliGRAM(s) Epidural once  oxytocin Infusion 42 milliUNIT(s)/Min (42 mL/Hr) IV Continuous <Continuous>    MEDICATIONS  (PRN):  dexAMETHasone  Injectable 4 milliGRAM(s) IV Push every 6 hours PRN Nausea  diphenhydrAMINE 25 milliGRAM(s) Oral every 4 hours PRN Pruritus  diphenhydrAMINE 25 milliGRAM(s) Oral every 6 hours PRN Pruritus  lanolin Ointment 1 Application(s) Topical every 6 hours PRN Sore Nipples  magnesium hydroxide Suspension 30 milliLiter(s) Oral two times a day PRN Constipation  nalbuphine Injectable 2.5 milliGRAM(s) IV Push every 6 hours PRN Pruritus  naloxone Injectable 0.1 milliGRAM(s) IV Push every 3 minutes PRN For ANY of the following changes in patient status:  A. Breaths Per Minute LESS THAN 10, B. Oxygen saturation LESS THAN 90%, C. Sedation score of 6 for Stop After: 4 Times  ondansetron Injectable 4 milliGRAM(s) IV Push every 6 hours PRN Nausea  oxyCODONE    IR 5 milliGRAM(s) Oral every 3 hours PRN Moderate to Severe Pain (4-10)  oxyCODONE    IR 5 milliGRAM(s) Oral once PRN Moderate to Severe Pain (4-10)  simethicone 80 milliGRAM(s) Chew every 4 hours PRN Gas      OBJECTIVE:    Sedation:        	[X] Alert	 [ ] Drowsy	[ ] Arousable      [ ] Asleep       [ ] Unresponsive    Side Effects:	[X] None 	[ ] Nausea	[ ] Vomiting         [ ] Pruritus  		[ ] Weakness            [ ] Numbness	          [ ] Other:    Vital Signs Last 24 Hrs  T(C): 36.9 (22 Apr 2025 05:42), Max: 36.9 (21 Apr 2025 21:00)  T(F): 98.5 (22 Apr 2025 05:42), Max: 98.5 (22 Apr 2025 05:42)  HR: 84 (22 Apr 2025 05:42) (72 - 85)  BP: 102/66 (22 Apr 2025 05:42) (96/59 - 102/66)  BP(mean): --  RR: 18 (22 Apr 2025 05:42) (18 - 18)  SpO2: 98% (22 Apr 2025 05:42) (97% - 100%)    Parameters below as of 22 Apr 2025 05:42  Patient On (Oxygen Delivery Method): room air        ASSESSMENT/ PLAN  [X] Patient transitioned to prn analgesics  [X] Pain management per primary service, pain service to sign off   [X]Documentation and Verification of current medications

## 2025-04-22 NOTE — PROGRESS NOTE ADULT - SUBJECTIVE AND OBJECTIVE BOX
OB Progress Note:  Delivery, POD#1    S: 35yo POD#1 s/p LTCS+BS+cerclage removal . Her pain is well controlled. She is tolerating a regular diet. Not yet passing flatus. Denies N/V. Denies CP/SOB/lightheadedness/dizziness.   She is ambulating without difficulty.   Voiding spontaneously.     O:   Vital Signs Last 24 Hrs  T(C): 36.8 (2025 00:36), Max: 37.2 (2025 05:15)  T(F): 98.3 (2025 00:36), Max: 99 (2025 05:15)  HR: 72 (2025 00:36) (69 - 101)  BP: 98/60 (2025 00:36) (96/59 - 117/58)  BP(mean): 86 (2025 08:45) (74 - 86)  RR: 18 (2025 00:36) (16 - 18)  SpO2: 98% (2025 00:36) (97% - 100%)    Parameters below as of 2025 00:36  Patient On (Oxygen Delivery Method): room air        Labs:  Blood type: O Positive  Rubella IgG: RPR: Negative                          10.5[L]   9.30 >-----------< 331    (  @ 02:25 )             33.1[L]                  PE:  General: NAD  Abdomen: Mildly distended, appropriately tender, incision c/d/i.  Extremities: No erythema, no pitting edema     OB Progress Note:  Delivery, POD#1    S: 35yo POD#1 s/p LTCS+BS+cerclage removal . Her pain is well controlled. She is tolerating a regular diet. She is passing flatus. Denies N/V. Denies CP/SOB/lightheadedness/dizziness.   She is ambulating without difficulty.   Voiding spontaneously.     O:   Vital Signs Last 24 Hrs  T(C): 36.8 (2025 00:36), Max: 37.2 (2025 05:15)  T(F): 98.3 (2025 00:36), Max: 99 (2025 05:15)  HR: 72 (2025 00:36) (69 - 101)  BP: 98/60 (2025 00:36) (96/59 - 117/58)  BP(mean): 86 (2025 08:45) (74 - 86)  RR: 18 (2025 00:36) (16 - 18)  SpO2: 98% (2025 00:36) (97% - 100%)    Parameters below as of 2025 00:36  Patient On (Oxygen Delivery Method): room air        Labs:  Blood type: O Positive  Rubella IgG: RPR: Negative                          10.5[L]   9.30 >-----------< 331    (  @ 02:25 )             33.1[L]                  PE:  General: NAD  Abdomen: Mildly distended, appropriately tender, incision c/d/i.  Extremities: No erythema, no pitting edema

## 2025-04-22 NOTE — PROGRESS NOTE ADULT - SUBJECTIVE AND OBJECTIVE BOX
Day 1 of Anesthesia Pain Management Service    SUBJECTIVE:  Pain Scale Score:          [X] Refer to charted pain scores    THERAPY: Received PF epidural morphine as above    OBJECTIVE:    Sedation:        	[X] Alert	[ ] Drowsy	[ ] Arousable      [ ] Asleep       [ ] Unresponsive    Side Effects:	[X] None	[ ] Nausea	[ ] Vomiting         [ ] Pruritus  		[ ] Weakness            [ ] Numbness	          [ ] Other:    ASSESSMENT/ PLAN  [X] Patient transitioned to prn analgesics  [X] Pain management per primary service, pain service to sign off   [X]Documentation and Verification of current medications    Donal Neal DO  Anesthesia

## 2025-04-23 VITALS
TEMPERATURE: 98 F | RESPIRATION RATE: 18 BRPM | SYSTOLIC BLOOD PRESSURE: 97 MMHG | DIASTOLIC BLOOD PRESSURE: 64 MMHG | HEART RATE: 79 BPM | OXYGEN SATURATION: 100 %

## 2025-04-23 RX ADMIN — LEVETIRACETAM 750 MILLIGRAM(S): 10 INJECTION, SOLUTION INTRAVENOUS at 05:43

## 2025-04-23 RX ADMIN — Medication 600 MILLIGRAM(S): at 00:08

## 2025-04-23 RX ADMIN — Medication 975 MILLIGRAM(S): at 09:13

## 2025-04-23 RX ADMIN — Medication 600 MILLIGRAM(S): at 06:12

## 2025-04-23 RX ADMIN — Medication 600 MILLIGRAM(S): at 12:16

## 2025-04-23 RX ADMIN — Medication 600 MILLIGRAM(S): at 05:42

## 2025-04-23 NOTE — DISCHARGE NOTE OB - DO NOT DIET OR “STARVE” YOURSELF INTO GETTING BACK TO PRE-PREGNANCY SHAPE
[Dear  ___] : Dear  [unfilled], [I had the pleasure of evaluating your patient, [unfilled] for ___] : I had the pleasure of evaluating your patient, [unfilled] for [unfilled]. [Attached please find my note.] : Attached please find my note. [FreeTextEntry2] : As you may recall she is an overweight  female with vaginal bleeding and an endometrial biopsy confirmed grade 1 endometrial cancer. We discussed a minimally invasive approach to her cance Statement Selected

## 2025-04-23 NOTE — PROGRESS NOTE ADULT - SUBJECTIVE AND OBJECTIVE BOX
OB Progress Note: LTCS, POD#2    S: 35yo POD#2 s/p rLTCS, BS, cerclage removal. Pain is well controlled. She is tolerating a regular diet and passing flatus. She is voiding spontaneously, and ambulating without difficulty. Denies CP/SOB. Denies lightheadedness/dizziness. Denies N/V.    O:  Vitals:  Vital Signs Last 24 Hrs  T(C): 36.9 (23 Apr 2025 04:51), Max: 37.2 (22 Apr 2025 09:48)  T(F): 98.4 (23 Apr 2025 04:51), Max: 99 (22 Apr 2025 09:48)  HR: 78 (23 Apr 2025 04:51) (78 - 90)  BP: 90/60 (23 Apr 2025 04:51) (90/60 - 106/72)  BP(mean): --  RR: 18 (23 Apr 2025 04:51) (18 - 18)  SpO2: 98% (23 Apr 2025 04:51) (97% - 99%)    Parameters below as of 23 Apr 2025 04:51  Patient On (Oxygen Delivery Method): room air        MEDICATIONS  (STANDING):  acetaminophen     Tablet .. 975 milliGRAM(s) Oral <User Schedule>  diphtheria/tetanus/pertussis (acellular) Vaccine (Adacel) 0.5 milliLiter(s) IntraMuscular once  heparin   Injectable 5000 Unit(s) SubCutaneous every 12 hours  ibuprofen  Tablet. 600 milliGRAM(s) Oral every 6 hours  influenza   Vaccine 0.5 milliLiter(s) IntraMuscular once  lactated ringers. 1000 milliLiter(s) (200 mL/Hr) IV Continuous <Continuous>  lactated ringers. 1000 milliLiter(s) (125 mL/Hr) IV Continuous <Continuous>  levETIRAcetam 750 milliGRAM(s) Oral two times a day  oxytocin Infusion 42 milliUNIT(s)/Min (42 mL/Hr) IV Continuous <Continuous>      MEDICATIONS  (PRN):  diphenhydrAMINE 25 milliGRAM(s) Oral every 6 hours PRN Pruritus  lanolin Ointment 1 Application(s) Topical every 6 hours PRN Sore Nipples  magnesium hydroxide Suspension 30 milliLiter(s) Oral two times a day PRN Constipation  oxyCODONE    IR 5 milliGRAM(s) Oral once PRN Moderate to Severe Pain (4-10)  oxyCODONE    IR 5 milliGRAM(s) Oral every 3 hours PRN Moderate to Severe Pain (4-10)  simethicone 80 milliGRAM(s) Chew every 4 hours PRN Gas      Labs:  Blood type: O Positive  Rubella IgG: RPR: Negative                          8.6[L]   12.58[H] >-----------< 256    ( 04-22 @ 06:58 )             26.8[L]                        10.5[L]   9.30 >-----------< 331    ( 04-21 @ 02:25 )             33.1[L]                  PE:  General: NAD  Abdomen: Soft, appropriately tender, incision c/d/i.  Extremities: No erythema, no pitting edema

## 2025-04-23 NOTE — DISCHARGE NOTE OB - PATIENT PORTAL LINK FT
You can access the FollowMyHealth Patient Portal offered by [facility] by registering at the following website: [email or web address]. By joining [facility]’s FollowMyHealth portal, you will also be able to view your health information using other applications (apps) compatible with our system.

## 2025-04-23 NOTE — PROGRESS NOTE ADULT - ATTENDING COMMENTS
Pt doing well this AM. Meeting all pp milestones. VSS. Exam benign. anticipate dc later today    Reza Schumacher MD

## 2025-04-23 NOTE — DISCHARGE NOTE OB - MEDICATION SUMMARY - MEDICATIONS TO TAKE
I will START or STAY ON the medications listed below when I get home from the hospital:    Keppra 750 mg oral tablet  -- 1 tab(s) by mouth 2 times a day  -- Indication: For Supervision of other normal pregnancy, antepartum    Multiple Vitamins oral tablet  -- 1 tab(s) by mouth once a day  -- Indication: For Supervision of other normal pregnancy, antepartum

## 2025-04-23 NOTE — DISCHARGE NOTE OB - CARE PROVIDER_API CALL
Reza Schumacher)  Obstetrics and Gynecology  [address]  [place] [address]  Phone: [phone]  Fax: [phone]  Follow Up Time: 2 weeks

## 2025-04-23 NOTE — DISCHARGE NOTE OB - HOSPITAL COURSE
Patient had uncomplicated low transverse  section and Bilateral Salpingectomy.   Please see operative note for details.  During postpartum course patient's vitals were stable, vaginal bleeding appropriate, and pain well controlled.  Post operation day one hematocrit was appropriate.  On day of discharge patient was ambulating, her pain controlled with oral medications, had adequate oral intake, and was voiding freely.  Discharge instructions and precautions were given.  Will return to the office in 2 weeks for incision check.

## 2025-04-23 NOTE — DISCHARGE NOTE OB - FINANCIAL ASSISTANCE
[facility] provides services at a reduced cost to those who are determined to be eligible through [facility]’s financial assistance program. Information regarding [facility]’s financial assistance program can be found by going to [email or web address] or by calling [phone].

## 2025-04-23 NOTE — DISCHARGE NOTE OB - NS MD DC FALL RISK RISK
For information on Fall & Injury Prevention, visit: [email or web address] OR  [email or web address] OR  https://www.cdc.gov/steadi/patient.html

## 2025-04-23 NOTE — PROGRESS NOTE ADULT - ASSESSMENT
A/P: 33yo POD#2 s/p rLTCS, BS, cerclage removal. . Hct: 33.1->26.8 Patient is stable and doing well post-operatively.    - Continue regular diet.  - Increase ambulation.  - Continue HSQ and venodynes in bed for DVT prophylaxis.  - Continue motrin, tylenol, oxycodone PRN for pain control.    Marcia Bee MD PGY1
A/P: 33yo POD#1 s/p LTCS+BS+cerclage removal.  Patient is stable and doing well post-operatively.    - Continue regular diet.  - Increase ambulation.  - Continue motrin, tylenol, oxycodone PRN for pain control.    -   - F/u AM CBC    Sonal Quijano, PGY1